# Patient Record
Sex: FEMALE | Race: BLACK OR AFRICAN AMERICAN | NOT HISPANIC OR LATINO | Employment: UNEMPLOYED | RURAL
[De-identification: names, ages, dates, MRNs, and addresses within clinical notes are randomized per-mention and may not be internally consistent; named-entity substitution may affect disease eponyms.]

---

## 2021-02-18 LAB
CREATININE URINE: 45
MICROALBUMIN UR-MCNC: 1.8 MG/DL
MICROALBUMIN/CREATININE RATIO: 40 UG/MG

## 2021-05-13 VITALS
HEART RATE: 91 BPM | WEIGHT: 91 LBS | HEIGHT: 62 IN | DIASTOLIC BLOOD PRESSURE: 83 MMHG | SYSTOLIC BLOOD PRESSURE: 128 MMHG | RESPIRATION RATE: 16 BRPM | BODY MASS INDEX: 16.75 KG/M2

## 2021-05-13 RX ORDER — SIMVASTATIN 5 MG/1
5 TABLET, FILM COATED ORAL NIGHTLY
COMMUNITY
End: 2021-08-17 | Stop reason: SDUPTHER

## 2021-05-13 RX ORDER — ASPIRIN 81 MG/1
81 TABLET ORAL DAILY
COMMUNITY

## 2021-05-13 RX ORDER — LISINOPRIL 10 MG/1
10 TABLET ORAL DAILY
COMMUNITY
End: 2021-08-17 | Stop reason: SDUPTHER

## 2021-05-13 RX ORDER — TRAMADOL HYDROCHLORIDE 50 MG/1
50 TABLET ORAL EVERY 6 HOURS PRN
COMMUNITY
End: 2021-06-28 | Stop reason: SDUPTHER

## 2021-05-13 RX ORDER — METFORMIN HYDROCHLORIDE 500 MG/1
500 TABLET ORAL 2 TIMES DAILY WITH MEALS
COMMUNITY
End: 2021-11-18 | Stop reason: ALTCHOICE

## 2021-05-13 RX ORDER — POTASSIUM CHLORIDE 750 MG/1
10 TABLET, EXTENDED RELEASE ORAL ONCE
COMMUNITY
End: 2021-08-17 | Stop reason: SDUPTHER

## 2021-05-13 RX ORDER — HYDROCHLOROTHIAZIDE 25 MG/1
25 TABLET ORAL DAILY
COMMUNITY
End: 2021-08-17 | Stop reason: SDUPTHER

## 2021-05-13 RX ORDER — IBUPROFEN 200 MG
CAPSULE ORAL DAILY
COMMUNITY
End: 2022-03-16

## 2021-05-18 ENCOUNTER — OFFICE VISIT (OUTPATIENT)
Dept: FAMILY MEDICINE | Facility: CLINIC | Age: 86
End: 2021-05-18
Payer: MEDICARE

## 2021-05-18 VITALS
DIASTOLIC BLOOD PRESSURE: 82 MMHG | OXYGEN SATURATION: 98 % | TEMPERATURE: 98 F | SYSTOLIC BLOOD PRESSURE: 139 MMHG | HEIGHT: 62 IN | HEART RATE: 93 BPM | BODY MASS INDEX: 17.26 KG/M2 | WEIGHT: 93.81 LBS

## 2021-05-18 DIAGNOSIS — I10 ESSENTIAL HYPERTENSION, MALIGNANT: Primary | ICD-10-CM

## 2021-05-18 DIAGNOSIS — E78.5 HYPERLIPIDEMIA, UNSPECIFIED HYPERLIPIDEMIA TYPE: ICD-10-CM

## 2021-05-18 DIAGNOSIS — E11.9 DIABETES MELLITUS WITHOUT COMPLICATION: ICD-10-CM

## 2021-05-18 LAB
ALBUMIN SERPL BCP-MCNC: 3.8 G/DL (ref 3.5–5)
ALBUMIN/GLOB SERPL: 1.2 {RATIO}
ALP SERPL-CCNC: 52 U/L (ref 55–142)
ALT SERPL W P-5'-P-CCNC: 14 U/L (ref 13–56)
ANION GAP SERPL CALCULATED.3IONS-SCNC: 10 MMOL/L (ref 7–16)
AST SERPL W P-5'-P-CCNC: 13 U/L (ref 15–37)
BILIRUB SERPL-MCNC: 0.4 MG/DL (ref 0–1.2)
BUN SERPL-MCNC: 20 MG/DL (ref 7–18)
BUN/CREAT SERPL: 30 (ref 6–20)
CALCIUM SERPL-MCNC: 9.4 MG/DL (ref 8.5–10.1)
CHLORIDE SERPL-SCNC: 103 MMOL/L (ref 98–107)
CHOLEST SERPL-MCNC: 167 MG/DL (ref 0–200)
CHOLEST/HDLC SERPL: 1.5 {RATIO}
CO2 SERPL-SCNC: 30 MMOL/L (ref 21–32)
CREAT SERPL-MCNC: 0.66 MG/DL (ref 0.55–1.02)
EST. AVERAGE GLUCOSE BLD GHB EST-MCNC: 94 MG/DL
GLOBULIN SER-MCNC: 3.3 G/DL (ref 2–4)
GLUCOSE SERPL-MCNC: 70 MG/DL (ref 74–106)
HBA1C MFR BLD HPLC: 5.4 % (ref 4.5–6.6)
HDLC SERPL-MCNC: 110 MG/DL (ref 40–60)
LDLC SERPL CALC-MCNC: 48 MG/DL
LDLC/HDLC SERPL: 0.4 {RATIO}
NONHDLC SERPL-MCNC: 57 MG/DL
POTASSIUM SERPL-SCNC: 4.2 MMOL/L (ref 3.5–5.1)
PROT SERPL-MCNC: 7.1 G/DL (ref 6.4–8.2)
SODIUM SERPL-SCNC: 139 MMOL/L (ref 136–145)
TRIGL SERPL-MCNC: 44 MG/DL (ref 35–150)
VLDLC SERPL-MCNC: 9 MG/DL

## 2021-05-18 PROCEDURE — 83036 HEMOGLOBIN A1C: ICD-10-PCS | Mod: ,,, | Performed by: CLINICAL MEDICAL LABORATORY

## 2021-05-18 PROCEDURE — 80053 COMPREHEN METABOLIC PANEL: CPT | Mod: ,,, | Performed by: CLINICAL MEDICAL LABORATORY

## 2021-05-18 PROCEDURE — 99214 PR OFFICE/OUTPT VISIT, EST, LEVL IV, 30-39 MIN: ICD-10-PCS | Mod: ,,, | Performed by: NURSE PRACTITIONER

## 2021-05-18 PROCEDURE — 83036 HEMOGLOBIN GLYCOSYLATED A1C: CPT | Mod: ,,, | Performed by: CLINICAL MEDICAL LABORATORY

## 2021-05-18 PROCEDURE — 1101F PT FALLS ASSESS-DOCD LE1/YR: CPT | Mod: ,,, | Performed by: NURSE PRACTITIONER

## 2021-05-18 PROCEDURE — 99214 OFFICE O/P EST MOD 30 MIN: CPT | Mod: ,,, | Performed by: NURSE PRACTITIONER

## 2021-05-18 PROCEDURE — 1126F AMNT PAIN NOTED NONE PRSNT: CPT | Mod: ,,, | Performed by: NURSE PRACTITIONER

## 2021-05-18 PROCEDURE — 1126F PR PAIN SEVERITY QUANTIFIED, NO PAIN PRESENT: ICD-10-PCS | Mod: ,,, | Performed by: NURSE PRACTITIONER

## 2021-05-18 PROCEDURE — 3288F PR FALLS RISK ASSESSMENT DOCUMENTED: ICD-10-PCS | Mod: ,,, | Performed by: NURSE PRACTITIONER

## 2021-05-18 PROCEDURE — 1101F PR PT FALLS ASSESS DOC 0-1 FALLS W/OUT INJ PAST YR: ICD-10-PCS | Mod: ,,, | Performed by: NURSE PRACTITIONER

## 2021-05-18 PROCEDURE — 1159F MED LIST DOCD IN RCRD: CPT | Mod: ,,, | Performed by: NURSE PRACTITIONER

## 2021-05-18 PROCEDURE — 1159F PR MEDICATION LIST DOCUMENTED IN MEDICAL RECORD: ICD-10-PCS | Mod: ,,, | Performed by: NURSE PRACTITIONER

## 2021-05-18 PROCEDURE — 80061 LIPID PANEL: ICD-10-PCS | Mod: ,,, | Performed by: CLINICAL MEDICAL LABORATORY

## 2021-05-18 PROCEDURE — 80061 LIPID PANEL: CPT | Mod: ,,, | Performed by: CLINICAL MEDICAL LABORATORY

## 2021-05-18 PROCEDURE — 80053 COMPREHENSIVE METABOLIC PANEL: ICD-10-PCS | Mod: ,,, | Performed by: CLINICAL MEDICAL LABORATORY

## 2021-05-18 PROCEDURE — 3288F FALL RISK ASSESSMENT DOCD: CPT | Mod: ,,, | Performed by: NURSE PRACTITIONER

## 2021-06-28 RX ORDER — TRAMADOL HYDROCHLORIDE 50 MG/1
50 TABLET ORAL EVERY 6 HOURS PRN
OUTPATIENT
Start: 2021-06-28

## 2021-06-29 RX ORDER — TRAMADOL HYDROCHLORIDE 50 MG/1
50 TABLET ORAL EVERY 6 HOURS PRN
Qty: 60 TABLET | Refills: 2 | Status: SHIPPED | OUTPATIENT
Start: 2021-06-29 | End: 2021-09-13 | Stop reason: SDUPTHER

## 2021-08-18 ENCOUNTER — OFFICE VISIT (OUTPATIENT)
Dept: FAMILY MEDICINE | Facility: CLINIC | Age: 86
End: 2021-08-18
Payer: MEDICARE

## 2021-08-18 VITALS
HEART RATE: 85 BPM | WEIGHT: 90.63 LBS | BODY MASS INDEX: 16.68 KG/M2 | HEIGHT: 62 IN | SYSTOLIC BLOOD PRESSURE: 143 MMHG | DIASTOLIC BLOOD PRESSURE: 84 MMHG

## 2021-08-18 DIAGNOSIS — Z79.4 TYPE 2 DIABETES MELLITUS WITHOUT COMPLICATION, WITH LONG-TERM CURRENT USE OF INSULIN: ICD-10-CM

## 2021-08-18 DIAGNOSIS — I10 ESSENTIAL HYPERTENSION, MALIGNANT: Primary | ICD-10-CM

## 2021-08-18 DIAGNOSIS — E78.5 HYPERLIPIDEMIA, UNSPECIFIED HYPERLIPIDEMIA TYPE: ICD-10-CM

## 2021-08-18 DIAGNOSIS — E11.9 TYPE 2 DIABETES MELLITUS WITHOUT COMPLICATION, WITH LONG-TERM CURRENT USE OF INSULIN: ICD-10-CM

## 2021-08-18 LAB
ALBUMIN SERPL BCP-MCNC: 3.8 G/DL (ref 3.5–5)
ALBUMIN/GLOB SERPL: 1.1 {RATIO}
ALP SERPL-CCNC: 52 U/L (ref 55–142)
ALT SERPL W P-5'-P-CCNC: 20 U/L (ref 13–56)
ANION GAP SERPL CALCULATED.3IONS-SCNC: 12 MMOL/L (ref 7–16)
AST SERPL W P-5'-P-CCNC: 21 U/L (ref 15–37)
BILIRUB SERPL-MCNC: 0.4 MG/DL (ref 0–1.2)
BUN SERPL-MCNC: 23 MG/DL (ref 7–18)
BUN/CREAT SERPL: 32 (ref 6–20)
CALCIUM SERPL-MCNC: 9.4 MG/DL (ref 8.5–10.1)
CHLORIDE SERPL-SCNC: 103 MMOL/L (ref 98–107)
CHOLEST SERPL-MCNC: 162 MG/DL (ref 0–200)
CHOLEST/HDLC SERPL: 1.4 {RATIO}
CO2 SERPL-SCNC: 27 MMOL/L (ref 21–32)
CREAT SERPL-MCNC: 0.71 MG/DL (ref 0.55–1.02)
GLOBULIN SER-MCNC: 3.6 G/DL (ref 2–4)
GLUCOSE SERPL-MCNC: 70 MG/DL (ref 74–106)
HDLC SERPL-MCNC: 119 MG/DL (ref 40–60)
LDLC SERPL CALC-MCNC: 33 MG/DL
LDLC/HDLC SERPL: 0.3 {RATIO}
NONHDLC SERPL-MCNC: 43 MG/DL
POTASSIUM SERPL-SCNC: 4 MMOL/L (ref 3.5–5.1)
PROT SERPL-MCNC: 7.4 G/DL (ref 6.4–8.2)
SODIUM SERPL-SCNC: 138 MMOL/L (ref 136–145)
TRIGL SERPL-MCNC: 50 MG/DL (ref 35–150)
VLDLC SERPL-MCNC: 10 MG/DL

## 2021-08-18 PROCEDURE — 1160F RVW MEDS BY RX/DR IN RCRD: CPT | Mod: ,,, | Performed by: NURSE PRACTITIONER

## 2021-08-18 PROCEDURE — 1159F PR MEDICATION LIST DOCUMENTED IN MEDICAL RECORD: ICD-10-PCS | Mod: ,,, | Performed by: NURSE PRACTITIONER

## 2021-08-18 PROCEDURE — 99212 OFFICE O/P EST SF 10 MIN: CPT | Mod: ,,, | Performed by: NURSE PRACTITIONER

## 2021-08-18 PROCEDURE — 1160F PR REVIEW ALL MEDS BY PRESCRIBER/CLIN PHARMACIST DOCUMENTED: ICD-10-PCS | Mod: ,,, | Performed by: NURSE PRACTITIONER

## 2021-08-18 PROCEDURE — 80053 COMPREHENSIVE METABOLIC PANEL: ICD-10-PCS | Mod: ,,, | Performed by: CLINICAL MEDICAL LABORATORY

## 2021-08-18 PROCEDURE — 80061 LIPID PANEL: CPT | Mod: ,,, | Performed by: CLINICAL MEDICAL LABORATORY

## 2021-08-18 PROCEDURE — 80061 LIPID PANEL: ICD-10-PCS | Mod: ,,, | Performed by: CLINICAL MEDICAL LABORATORY

## 2021-08-18 PROCEDURE — 80053 COMPREHEN METABOLIC PANEL: CPT | Mod: ,,, | Performed by: CLINICAL MEDICAL LABORATORY

## 2021-08-18 PROCEDURE — 1159F MED LIST DOCD IN RCRD: CPT | Mod: ,,, | Performed by: NURSE PRACTITIONER

## 2021-08-18 PROCEDURE — 99212 PR OFFICE/OUTPT VISIT, EST, LEVL II, 10-19 MIN: ICD-10-PCS | Mod: ,,, | Performed by: NURSE PRACTITIONER

## 2021-08-18 RX ORDER — LISINOPRIL 10 MG/1
10 TABLET ORAL DAILY
Qty: 90 TABLET | Refills: 0 | Status: SHIPPED | OUTPATIENT
Start: 2021-08-18 | End: 2021-11-17 | Stop reason: SDUPTHER

## 2021-08-18 RX ORDER — POTASSIUM CHLORIDE 750 MG/1
10 TABLET, EXTENDED RELEASE ORAL DAILY
Qty: 90 TABLET | Refills: 0 | Status: SHIPPED | OUTPATIENT
Start: 2021-08-18 | End: 2021-11-17 | Stop reason: SDUPTHER

## 2021-08-18 RX ORDER — SIMVASTATIN 5 MG/1
5 TABLET, FILM COATED ORAL NIGHTLY
Qty: 90 TABLET | Refills: 0 | Status: SHIPPED | OUTPATIENT
Start: 2021-08-18 | End: 2021-11-17 | Stop reason: SDUPTHER

## 2021-08-18 RX ORDER — METFORMIN HYDROCHLORIDE 500 MG/1
500 TABLET ORAL 2 TIMES DAILY WITH MEALS
Qty: 180 TABLET | Refills: 0 | Status: CANCELLED | OUTPATIENT
Start: 2021-08-18

## 2021-08-18 RX ORDER — HYDROCHLOROTHIAZIDE 25 MG/1
25 TABLET ORAL DAILY
Qty: 90 TABLET | Refills: 0 | Status: SHIPPED | OUTPATIENT
Start: 2021-08-18 | End: 2021-11-17 | Stop reason: SDUPTHER

## 2021-08-31 RX ORDER — TRAMADOL HYDROCHLORIDE 50 MG/1
50 TABLET ORAL EVERY 6 HOURS PRN
Qty: 60 TABLET | Refills: 2 | Status: CANCELLED | OUTPATIENT
Start: 2021-08-31

## 2021-09-09 ENCOUNTER — OFFICE VISIT (OUTPATIENT)
Dept: FAMILY MEDICINE | Facility: CLINIC | Age: 86
End: 2021-09-09
Payer: MEDICARE

## 2021-09-09 ENCOUNTER — PATIENT OUTREACH (OUTPATIENT)
Dept: FAMILY MEDICINE | Facility: CLINIC | Age: 86
End: 2021-09-09

## 2021-09-09 VITALS
DIASTOLIC BLOOD PRESSURE: 80 MMHG | WEIGHT: 90 LBS | HEIGHT: 62 IN | BODY MASS INDEX: 16.56 KG/M2 | TEMPERATURE: 98 F | SYSTOLIC BLOOD PRESSURE: 118 MMHG | OXYGEN SATURATION: 99 % | HEART RATE: 83 BPM | RESPIRATION RATE: 20 BRPM

## 2021-09-09 DIAGNOSIS — E11.9 TYPE 2 DIABETES MELLITUS WITHOUT COMPLICATION, WITH LONG-TERM CURRENT USE OF INSULIN: Primary | ICD-10-CM

## 2021-09-09 DIAGNOSIS — Z13.89 ENCOUNTER FOR SCREENING FOR OTHER DISORDER: ICD-10-CM

## 2021-09-09 DIAGNOSIS — I10 ESSENTIAL HYPERTENSION, MALIGNANT: ICD-10-CM

## 2021-09-09 DIAGNOSIS — E78.5 HYPERLIPIDEMIA, UNSPECIFIED HYPERLIPIDEMIA TYPE: ICD-10-CM

## 2021-09-09 DIAGNOSIS — R63.6 UNDERWEIGHT: ICD-10-CM

## 2021-09-09 DIAGNOSIS — E63.9 POOR NUTRITION: ICD-10-CM

## 2021-09-09 DIAGNOSIS — Z74.09 OTHER REDUCED MOBILITY: ICD-10-CM

## 2021-09-09 DIAGNOSIS — Z00.00 ENCOUNTER FOR PREVENTIVE HEALTH EXAMINATION: ICD-10-CM

## 2021-09-09 DIAGNOSIS — Z79.4 TYPE 2 DIABETES MELLITUS WITHOUT COMPLICATION, WITH LONG-TERM CURRENT USE OF INSULIN: Primary | ICD-10-CM

## 2021-09-09 PROCEDURE — 1160F PR REVIEW ALL MEDS BY PRESCRIBER/CLIN PHARMACIST DOCUMENTED: ICD-10-PCS | Mod: ,,, | Performed by: NURSE PRACTITIONER

## 2021-09-09 PROCEDURE — 1101F PT FALLS ASSESS-DOCD LE1/YR: CPT | Mod: ,,, | Performed by: NURSE PRACTITIONER

## 2021-09-09 PROCEDURE — 1125F AMNT PAIN NOTED PAIN PRSNT: CPT | Mod: ,,, | Performed by: NURSE PRACTITIONER

## 2021-09-09 PROCEDURE — 3048F PR MOST RECENT LDL-C < 100 MG/DL: ICD-10-PCS | Mod: ,,, | Performed by: NURSE PRACTITIONER

## 2021-09-09 PROCEDURE — G0439 PR MEDICARE ANNUAL WELLNESS SUBSEQUENT VISIT: ICD-10-PCS | Mod: CG,,, | Performed by: NURSE PRACTITIONER

## 2021-09-09 PROCEDURE — 3061F PR NEG MICROALBUMINURIA RESULT DOCUMENTED/REVIEW: ICD-10-PCS | Mod: ,,, | Performed by: NURSE PRACTITIONER

## 2021-09-09 PROCEDURE — 1036F PR CURRENT TOBACCO NON-USER: ICD-10-PCS | Mod: ,,, | Performed by: NURSE PRACTITIONER

## 2021-09-09 PROCEDURE — 3079F PR MOST RECENT DIASTOLIC BLOOD PRESSURE 80-89 MM HG: ICD-10-PCS | Mod: ,,, | Performed by: NURSE PRACTITIONER

## 2021-09-09 PROCEDURE — 1036F TOBACCO NON-USER: CPT | Mod: ,,, | Performed by: NURSE PRACTITIONER

## 2021-09-09 PROCEDURE — 1170F FXNL STATUS ASSESSED: CPT | Mod: ,,, | Performed by: NURSE PRACTITIONER

## 2021-09-09 PROCEDURE — 3288F PR FALLS RISK ASSESSMENT DOCUMENTED: ICD-10-PCS | Mod: ,,, | Performed by: NURSE PRACTITIONER

## 2021-09-09 PROCEDURE — 3048F LDL-C <100 MG/DL: CPT | Mod: ,,, | Performed by: NURSE PRACTITIONER

## 2021-09-09 PROCEDURE — 3074F PR MOST RECENT SYSTOLIC BLOOD PRESSURE < 130 MM HG: ICD-10-PCS | Mod: ,,, | Performed by: NURSE PRACTITIONER

## 2021-09-09 PROCEDURE — 1159F PR MEDICATION LIST DOCUMENTED IN MEDICAL RECORD: ICD-10-PCS | Mod: ,,, | Performed by: NURSE PRACTITIONER

## 2021-09-09 PROCEDURE — 1170F PR FUNCTIONAL STATUS ASSESSED: ICD-10-PCS | Mod: ,,, | Performed by: NURSE PRACTITIONER

## 2021-09-09 PROCEDURE — 3288F FALL RISK ASSESSMENT DOCD: CPT | Mod: ,,, | Performed by: NURSE PRACTITIONER

## 2021-09-09 PROCEDURE — G0439 PPPS, SUBSEQ VISIT: HCPCS | Mod: CG,,, | Performed by: NURSE PRACTITIONER

## 2021-09-09 PROCEDURE — 3079F DIAST BP 80-89 MM HG: CPT | Mod: ,,, | Performed by: NURSE PRACTITIONER

## 2021-09-09 PROCEDURE — 1158F PR ADVANCE CARE PLANNING DISCUSS DOCUMENTED IN MEDICAL RECORD: ICD-10-PCS | Mod: ,,, | Performed by: NURSE PRACTITIONER

## 2021-09-09 PROCEDURE — 1101F PR PT FALLS ASSESS DOC 0-1 FALLS W/OUT INJ PAST YR: ICD-10-PCS | Mod: ,,, | Performed by: NURSE PRACTITIONER

## 2021-09-09 PROCEDURE — 3061F NEG MICROALBUMINURIA REV: CPT | Mod: ,,, | Performed by: NURSE PRACTITIONER

## 2021-09-09 PROCEDURE — 1160F RVW MEDS BY RX/DR IN RCRD: CPT | Mod: ,,, | Performed by: NURSE PRACTITIONER

## 2021-09-09 PROCEDURE — 3074F SYST BP LT 130 MM HG: CPT | Mod: ,,, | Performed by: NURSE PRACTITIONER

## 2021-09-09 PROCEDURE — 1125F PR PAIN SEVERITY QUANTIFIED, PAIN PRESENT: ICD-10-PCS | Mod: ,,, | Performed by: NURSE PRACTITIONER

## 2021-09-09 PROCEDURE — 1159F MED LIST DOCD IN RCRD: CPT | Mod: ,,, | Performed by: NURSE PRACTITIONER

## 2021-09-09 PROCEDURE — 1158F ADVNC CARE PLAN TLK DOCD: CPT | Mod: ,,, | Performed by: NURSE PRACTITIONER

## 2021-09-13 RX ORDER — TRAMADOL HYDROCHLORIDE 50 MG/1
50 TABLET ORAL EVERY 6 HOURS PRN
Qty: 60 TABLET | Refills: 2 | Status: SHIPPED | OUTPATIENT
Start: 2021-09-13 | End: 2021-11-18 | Stop reason: SDUPTHER

## 2021-09-21 ENCOUNTER — CLINICAL SUPPORT (OUTPATIENT)
Dept: FAMILY MEDICINE | Facility: CLINIC | Age: 86
End: 2021-09-21
Payer: MEDICARE

## 2021-09-21 PROCEDURE — 90686 FLU VACCINE (QUAD) GREATER THAN OR EQUAL TO 3YO PRESERVATIVE FREE IM: ICD-10-PCS | Mod: ,,, | Performed by: NURSE PRACTITIONER

## 2021-09-21 PROCEDURE — G0008 ADMIN INFLUENZA VIRUS VAC: HCPCS | Mod: ,,, | Performed by: NURSE PRACTITIONER

## 2021-09-21 PROCEDURE — 90686 IIV4 VACC NO PRSV 0.5 ML IM: CPT | Mod: ,,, | Performed by: NURSE PRACTITIONER

## 2021-09-21 PROCEDURE — G0008 FLU VACCINE (QUAD) GREATER THAN OR EQUAL TO 3YO PRESERVATIVE FREE IM: ICD-10-PCS | Mod: ,,, | Performed by: NURSE PRACTITIONER

## 2021-11-18 ENCOUNTER — OFFICE VISIT (OUTPATIENT)
Dept: FAMILY MEDICINE | Facility: CLINIC | Age: 86
End: 2021-11-18
Payer: MEDICARE

## 2021-11-18 VITALS
RESPIRATION RATE: 16 BRPM | WEIGHT: 94 LBS | DIASTOLIC BLOOD PRESSURE: 77 MMHG | HEART RATE: 89 BPM | BODY MASS INDEX: 17.3 KG/M2 | SYSTOLIC BLOOD PRESSURE: 134 MMHG | HEIGHT: 62 IN

## 2021-11-18 DIAGNOSIS — I10 ESSENTIAL HYPERTENSION, MALIGNANT: Primary | ICD-10-CM

## 2021-11-18 DIAGNOSIS — E78.5 HYPERLIPIDEMIA, UNSPECIFIED HYPERLIPIDEMIA TYPE: ICD-10-CM

## 2021-11-18 DIAGNOSIS — I10 ESSENTIAL HYPERTENSION, MALIGNANT: ICD-10-CM

## 2021-11-18 DIAGNOSIS — E11.9 DIABETES MELLITUS WITHOUT COMPLICATION: ICD-10-CM

## 2021-11-18 DIAGNOSIS — J01.10 ACUTE NON-RECURRENT FRONTAL SINUSITIS: ICD-10-CM

## 2021-11-18 LAB
ALBUMIN SERPL BCP-MCNC: 3.7 G/DL (ref 3.5–5)
ALBUMIN/GLOB SERPL: 0.9 {RATIO}
ALP SERPL-CCNC: 56 U/L (ref 55–142)
ALT SERPL W P-5'-P-CCNC: 19 U/L (ref 13–56)
ANION GAP SERPL CALCULATED.3IONS-SCNC: 10 MMOL/L (ref 7–16)
AST SERPL W P-5'-P-CCNC: 17 U/L (ref 15–37)
BASOPHILS # BLD AUTO: 0.05 K/UL (ref 0–0.2)
BASOPHILS NFR BLD AUTO: 0.5 % (ref 0–1)
BILIRUB SERPL-MCNC: 0.5 MG/DL (ref 0–1.2)
BUN SERPL-MCNC: 19 MG/DL (ref 7–18)
BUN/CREAT SERPL: 23 (ref 6–20)
CALCIUM SERPL-MCNC: 9.6 MG/DL (ref 8.5–10.1)
CHLORIDE SERPL-SCNC: 104 MMOL/L (ref 98–107)
CHOLEST SERPL-MCNC: 177 MG/DL (ref 0–200)
CHOLEST/HDLC SERPL: 1.5 {RATIO}
CO2 SERPL-SCNC: 29 MMOL/L (ref 21–32)
CREAT SERPL-MCNC: 0.81 MG/DL (ref 0.55–1.02)
DIFFERENTIAL METHOD BLD: ABNORMAL
EOSINOPHIL # BLD AUTO: 0.09 K/UL (ref 0–0.5)
EOSINOPHIL NFR BLD AUTO: 0.9 % (ref 1–4)
ERYTHROCYTE [DISTWIDTH] IN BLOOD BY AUTOMATED COUNT: 12.8 % (ref 11.5–14.5)
EST. AVERAGE GLUCOSE BLD GHB EST-MCNC: 107 MG/DL
GLOBULIN SER-MCNC: 3.9 G/DL (ref 2–4)
GLUCOSE SERPL-MCNC: 101 MG/DL (ref 74–106)
HBA1C MFR BLD HPLC: 5.8 % (ref 4.5–6.6)
HCT VFR BLD AUTO: 38.8 % (ref 38–47)
HDLC SERPL-MCNC: 115 MG/DL (ref 40–60)
HGB BLD-MCNC: 11.8 G/DL (ref 12–16)
IMM GRANULOCYTES # BLD AUTO: 0.05 K/UL (ref 0–0.04)
IMM GRANULOCYTES NFR BLD: 0.5 % (ref 0–0.4)
LDLC SERPL CALC-MCNC: 50 MG/DL
LDLC/HDLC SERPL: 0.4 {RATIO}
LYMPHOCYTES # BLD AUTO: 2.25 K/UL (ref 1–4.8)
LYMPHOCYTES NFR BLD AUTO: 21.3 % (ref 27–41)
MCH RBC QN AUTO: 29.1 PG (ref 27–31)
MCHC RBC AUTO-ENTMCNC: 30.4 G/DL (ref 32–36)
MCV RBC AUTO: 95.8 FL (ref 80–96)
MONOCYTES # BLD AUTO: 0.6 K/UL (ref 0–0.8)
MONOCYTES NFR BLD AUTO: 5.7 % (ref 2–6)
MPC BLD CALC-MCNC: 10 FL (ref 9.4–12.4)
NEUTROPHILS # BLD AUTO: 7.54 K/UL (ref 1.8–7.7)
NEUTROPHILS NFR BLD AUTO: 71.1 % (ref 53–65)
NONHDLC SERPL-MCNC: 62 MG/DL
NRBC # BLD AUTO: 0 X10E3/UL
NRBC, AUTO (.00): 0 %
PLATELET # BLD AUTO: 301 K/UL (ref 150–400)
POTASSIUM SERPL-SCNC: 4.1 MMOL/L (ref 3.5–5.1)
PROT SERPL-MCNC: 7.6 G/DL (ref 6.4–8.2)
RBC # BLD AUTO: 4.05 M/UL (ref 4.2–5.4)
SODIUM SERPL-SCNC: 139 MMOL/L (ref 136–145)
TRIGL SERPL-MCNC: 60 MG/DL (ref 35–150)
VLDLC SERPL-MCNC: 12 MG/DL
WBC # BLD AUTO: 10.58 K/UL (ref 4.5–11)

## 2021-11-18 PROCEDURE — 99213 OFFICE O/P EST LOW 20 MIN: CPT | Mod: 25,,, | Performed by: NURSE PRACTITIONER

## 2021-11-18 PROCEDURE — 1160F RVW MEDS BY RX/DR IN RCRD: CPT | Mod: ,,, | Performed by: NURSE PRACTITIONER

## 2021-11-18 PROCEDURE — 80053 COMPREHEN METABOLIC PANEL: CPT | Mod: ,,, | Performed by: CLINICAL MEDICAL LABORATORY

## 2021-11-18 PROCEDURE — 80061 LIPID PANEL: ICD-10-PCS | Mod: ,,, | Performed by: CLINICAL MEDICAL LABORATORY

## 2021-11-18 PROCEDURE — 85025 COMPLETE CBC W/AUTO DIFF WBC: CPT | Mod: ,,, | Performed by: CLINICAL MEDICAL LABORATORY

## 2021-11-18 PROCEDURE — 83036 HEMOGLOBIN GLYCOSYLATED A1C: CPT | Mod: ,,, | Performed by: CLINICAL MEDICAL LABORATORY

## 2021-11-18 PROCEDURE — 1160F PR REVIEW ALL MEDS BY PRESCRIBER/CLIN PHARMACIST DOCUMENTED: ICD-10-PCS | Mod: ,,, | Performed by: NURSE PRACTITIONER

## 2021-11-18 PROCEDURE — 96372 PR INJECTION,THERAP/PROPH/DIAG2ST, IM OR SUBCUT: ICD-10-PCS | Mod: ,,, | Performed by: NURSE PRACTITIONER

## 2021-11-18 PROCEDURE — 83036 HEMOGLOBIN A1C: ICD-10-PCS | Mod: ,,, | Performed by: CLINICAL MEDICAL LABORATORY

## 2021-11-18 PROCEDURE — 99213 PR OFFICE/OUTPT VISIT, EST, LEVL III, 20-29 MIN: ICD-10-PCS | Mod: 25,,, | Performed by: NURSE PRACTITIONER

## 2021-11-18 PROCEDURE — 1159F PR MEDICATION LIST DOCUMENTED IN MEDICAL RECORD: ICD-10-PCS | Mod: ,,, | Performed by: NURSE PRACTITIONER

## 2021-11-18 PROCEDURE — 1159F MED LIST DOCD IN RCRD: CPT | Mod: ,,, | Performed by: NURSE PRACTITIONER

## 2021-11-18 PROCEDURE — 80061 LIPID PANEL: CPT | Mod: ,,, | Performed by: CLINICAL MEDICAL LABORATORY

## 2021-11-18 PROCEDURE — 80053 COMPREHENSIVE METABOLIC PANEL: ICD-10-PCS | Mod: ,,, | Performed by: CLINICAL MEDICAL LABORATORY

## 2021-11-18 PROCEDURE — 85025 CBC WITH DIFFERENTIAL: ICD-10-PCS | Mod: ,,, | Performed by: CLINICAL MEDICAL LABORATORY

## 2021-11-18 PROCEDURE — 96372 THER/PROPH/DIAG INJ SC/IM: CPT | Mod: ,,, | Performed by: NURSE PRACTITIONER

## 2021-11-18 RX ORDER — BETAMETHASONE SODIUM PHOSPHATE AND BETAMETHASONE ACETATE 3; 3 MG/ML; MG/ML
6 INJECTION, SUSPENSION INTRA-ARTICULAR; INTRALESIONAL; INTRAMUSCULAR; SOFT TISSUE
Status: COMPLETED | OUTPATIENT
Start: 2021-11-18 | End: 2021-11-18

## 2021-11-18 RX ORDER — TRAMADOL HYDROCHLORIDE 50 MG/1
50 TABLET ORAL EVERY 6 HOURS PRN
Qty: 60 TABLET | Refills: 2 | Status: SHIPPED | OUTPATIENT
Start: 2021-11-18 | End: 2022-05-05 | Stop reason: SDUPTHER

## 2021-11-18 RX ORDER — AZITHROMYCIN 250 MG/1
TABLET, FILM COATED ORAL
Qty: 6 TABLET | Refills: 0 | Status: SHIPPED | OUTPATIENT
Start: 2021-11-18 | End: 2021-11-23

## 2021-11-18 RX ORDER — HYDROCHLOROTHIAZIDE 25 MG/1
25 TABLET ORAL DAILY
Qty: 90 TABLET | Refills: 1 | Status: SHIPPED | OUTPATIENT
Start: 2021-11-18 | End: 2022-02-03 | Stop reason: SDUPTHER

## 2021-11-18 RX ORDER — METFORMIN HYDROCHLORIDE 500 MG/1
500 TABLET ORAL 2 TIMES DAILY WITH MEALS
Qty: 180 TABLET | Refills: 1 | Status: CANCELLED | OUTPATIENT
Start: 2021-11-18

## 2021-11-18 RX ORDER — CEFTRIAXONE 500 MG/1
500 INJECTION, POWDER, FOR SOLUTION INTRAMUSCULAR; INTRAVENOUS
Status: COMPLETED | OUTPATIENT
Start: 2021-11-18 | End: 2021-11-18

## 2021-11-18 RX ORDER — POTASSIUM CHLORIDE 750 MG/1
10 TABLET, EXTENDED RELEASE ORAL DAILY
Qty: 90 TABLET | Refills: 1 | Status: SHIPPED | OUTPATIENT
Start: 2021-11-18 | End: 2022-02-03 | Stop reason: SDUPTHER

## 2021-11-18 RX ORDER — LISINOPRIL 10 MG/1
10 TABLET ORAL DAILY
Qty: 90 TABLET | Refills: 1 | Status: SHIPPED | OUTPATIENT
Start: 2021-11-18 | End: 2022-02-03 | Stop reason: SDUPTHER

## 2021-11-18 RX ORDER — SIMVASTATIN 5 MG/1
5 TABLET, FILM COATED ORAL NIGHTLY
Qty: 90 TABLET | Refills: 1 | Status: SHIPPED | OUTPATIENT
Start: 2021-11-18 | End: 2022-02-03 | Stop reason: SDUPTHER

## 2021-11-18 RX ADMIN — CEFTRIAXONE 500 MG: 500 INJECTION, POWDER, FOR SOLUTION INTRAMUSCULAR; INTRAVENOUS at 09:11

## 2021-11-18 RX ADMIN — BETAMETHASONE SODIUM PHOSPHATE AND BETAMETHASONE ACETATE 6 MG: 3; 3 INJECTION, SUSPENSION INTRA-ARTICULAR; INTRALESIONAL; INTRAMUSCULAR; SOFT TISSUE at 09:11

## 2022-02-03 ENCOUNTER — OFFICE VISIT (OUTPATIENT)
Dept: FAMILY MEDICINE | Facility: CLINIC | Age: 87
End: 2022-02-03
Payer: MEDICARE

## 2022-02-03 VITALS
SYSTOLIC BLOOD PRESSURE: 138 MMHG | BODY MASS INDEX: 16.75 KG/M2 | WEIGHT: 91 LBS | DIASTOLIC BLOOD PRESSURE: 78 MMHG | RESPIRATION RATE: 18 BRPM | HEIGHT: 62 IN | HEART RATE: 118 BPM

## 2022-02-03 DIAGNOSIS — E78.5 HYPERLIPIDEMIA, UNSPECIFIED HYPERLIPIDEMIA TYPE: ICD-10-CM

## 2022-02-03 DIAGNOSIS — I10 ESSENTIAL HYPERTENSION, MALIGNANT: ICD-10-CM

## 2022-02-03 DIAGNOSIS — E11.9 DIABETES MELLITUS WITHOUT COMPLICATION: ICD-10-CM

## 2022-02-03 DIAGNOSIS — I10 ESSENTIAL HYPERTENSION, MALIGNANT: Primary | ICD-10-CM

## 2022-02-03 LAB
ALBUMIN SERPL BCP-MCNC: 2.5 G/DL (ref 3.5–5)
ALBUMIN/GLOB SERPL: 0.5 {RATIO}
ALP SERPL-CCNC: 107 U/L (ref 55–142)
ALT SERPL W P-5'-P-CCNC: 20 U/L (ref 13–56)
ANION GAP SERPL CALCULATED.3IONS-SCNC: 9 MMOL/L (ref 7–16)
AST SERPL W P-5'-P-CCNC: 20 U/L (ref 15–37)
BILIRUB SERPL-MCNC: 0.6 MG/DL (ref 0–1.2)
BUN SERPL-MCNC: 14 MG/DL (ref 7–18)
BUN/CREAT SERPL: 20 (ref 6–20)
CALCIUM SERPL-MCNC: 9.7 MG/DL (ref 8.5–10.1)
CHLORIDE SERPL-SCNC: 101 MMOL/L (ref 98–107)
CHOLEST SERPL-MCNC: 121 MG/DL (ref 0–200)
CHOLEST/HDLC SERPL: 1.6 {RATIO}
CO2 SERPL-SCNC: 32 MMOL/L (ref 21–32)
CREAT SERPL-MCNC: 0.71 MG/DL (ref 0.55–1.02)
EST. AVERAGE GLUCOSE BLD GHB EST-MCNC: 107 MG/DL
GLOBULIN SER-MCNC: 5.2 G/DL (ref 2–4)
GLUCOSE SERPL-MCNC: 119 MG/DL (ref 74–106)
HBA1C MFR BLD HPLC: 5.8 % (ref 4.5–6.6)
HDLC SERPL-MCNC: 76 MG/DL (ref 40–60)
LDLC SERPL CALC-MCNC: 32 MG/DL
LDLC/HDLC SERPL: 0.4 {RATIO}
NONHDLC SERPL-MCNC: 45 MG/DL
POTASSIUM SERPL-SCNC: 4.3 MMOL/L (ref 3.5–5.1)
PROT SERPL-MCNC: 7.7 G/DL (ref 6.4–8.2)
SODIUM SERPL-SCNC: 138 MMOL/L (ref 136–145)
TRIGL SERPL-MCNC: 64 MG/DL (ref 35–150)
VLDLC SERPL-MCNC: 13 MG/DL

## 2022-02-03 PROCEDURE — 1160F PR REVIEW ALL MEDS BY PRESCRIBER/CLIN PHARMACIST DOCUMENTED: ICD-10-PCS | Mod: ,,, | Performed by: NURSE PRACTITIONER

## 2022-02-03 PROCEDURE — 80061 LIPID PANEL: ICD-10-PCS | Mod: ,,, | Performed by: CLINICAL MEDICAL LABORATORY

## 2022-02-03 PROCEDURE — 99212 PR OFFICE/OUTPT VISIT, EST, LEVL II, 10-19 MIN: ICD-10-PCS | Mod: ,,, | Performed by: NURSE PRACTITIONER

## 2022-02-03 PROCEDURE — 99212 OFFICE O/P EST SF 10 MIN: CPT | Mod: ,,, | Performed by: NURSE PRACTITIONER

## 2022-02-03 PROCEDURE — 1159F PR MEDICATION LIST DOCUMENTED IN MEDICAL RECORD: ICD-10-PCS | Mod: ,,, | Performed by: NURSE PRACTITIONER

## 2022-02-03 PROCEDURE — 1160F RVW MEDS BY RX/DR IN RCRD: CPT | Mod: ,,, | Performed by: NURSE PRACTITIONER

## 2022-02-03 PROCEDURE — 1159F MED LIST DOCD IN RCRD: CPT | Mod: ,,, | Performed by: NURSE PRACTITIONER

## 2022-02-03 PROCEDURE — 83036 HEMOGLOBIN GLYCOSYLATED A1C: CPT | Mod: ,,, | Performed by: CLINICAL MEDICAL LABORATORY

## 2022-02-03 PROCEDURE — 83036 HEMOGLOBIN A1C: ICD-10-PCS | Mod: ,,, | Performed by: CLINICAL MEDICAL LABORATORY

## 2022-02-03 PROCEDURE — 80053 COMPREHENSIVE METABOLIC PANEL: ICD-10-PCS | Mod: ,,, | Performed by: CLINICAL MEDICAL LABORATORY

## 2022-02-03 PROCEDURE — 80061 LIPID PANEL: CPT | Mod: ,,, | Performed by: CLINICAL MEDICAL LABORATORY

## 2022-02-03 PROCEDURE — 80053 COMPREHEN METABOLIC PANEL: CPT | Mod: ,,, | Performed by: CLINICAL MEDICAL LABORATORY

## 2022-02-03 RX ORDER — MEGESTROL ACETATE 40 MG/1
40 TABLET ORAL DAILY
Qty: 90 TABLET | Refills: 1 | Status: SHIPPED | OUTPATIENT
Start: 2022-02-03 | End: 2022-05-05 | Stop reason: SDUPTHER

## 2022-02-03 RX ORDER — HYDROCHLOROTHIAZIDE 25 MG/1
25 TABLET ORAL DAILY
Qty: 90 TABLET | Refills: 1 | Status: SHIPPED | OUTPATIENT
Start: 2022-02-03 | End: 2022-05-05 | Stop reason: SDUPTHER

## 2022-02-03 RX ORDER — POTASSIUM CHLORIDE 750 MG/1
10 TABLET, EXTENDED RELEASE ORAL DAILY
Qty: 90 TABLET | Refills: 1 | Status: SHIPPED | OUTPATIENT
Start: 2022-02-03 | End: 2022-05-05 | Stop reason: SDUPTHER

## 2022-02-03 RX ORDER — SIMVASTATIN 5 MG/1
5 TABLET, FILM COATED ORAL NIGHTLY
Qty: 90 TABLET | Refills: 1 | Status: SHIPPED | OUTPATIENT
Start: 2022-02-03 | End: 2022-05-05 | Stop reason: SDUPTHER

## 2022-02-03 RX ORDER — LISINOPRIL 10 MG/1
10 TABLET ORAL DAILY
Qty: 90 TABLET | Refills: 1 | Status: SHIPPED | OUTPATIENT
Start: 2022-02-03 | End: 2022-05-05 | Stop reason: SDUPTHER

## 2022-02-03 NOTE — PROGRESS NOTES
"New clinic note    Gisel Shelby is a 89 y.o. female      Chief Complaint   Patient presents with    Follow-up    Hyperlipidemia    Diabetes        Subjective:  Pt has lost 3pounds since last appt. Will start on megace. Encourage use of supplemental shakes between meals and to eat several small meals throughout the day. She cont to hold dm meds and monitors glucose at home. Overall doing well.        Past Medical History:   Diagnosis Date    Hypertension       Family History   Problem Relation Age of Onset    Hypertension Mother     Diabetes Sister     Hypertension Sister     Diabetes Brother     Hypertension Brother       Past Surgical History:   Procedure Laterality Date    APPENDECTOMY        Social History     Socioeconomic History    Marital status: Single   Tobacco Use    Smoking status: Never Smoker    Smokeless tobacco: Never Used   Substance and Sexual Activity    Alcohol use: Never    Drug use: Never    Sexual activity: Not Currently        Review of Systems   Constitutional: Positive for appetite change. Negative for fatigue and fever.   HENT: Negative for nasal congestion and sore throat.    Eyes: Negative for visual disturbance.   Respiratory: Negative for chest tightness and shortness of breath.    Cardiovascular: Negative for chest pain and leg swelling.   Gastrointestinal: Negative for abdominal pain, change in bowel habit and change in bowel habit.   Endocrine: Negative for polydipsia, polyphagia and polyuria.   Genitourinary: Negative for dysuria and hematuria.   Musculoskeletal: Negative for back pain and leg pain.   Integumentary:  Negative for rash.   Neurological: Negative for dizziness, syncope, weakness and light-headedness.        Objective:  /78 (BP Location: Left arm, Patient Position: Sitting, BP Method: Small (Automatic))   Pulse (!) 118   Resp 18   Ht 5' 2" (1.575 m)   Wt 41.3 kg (91 lb)   BMI 16.64 kg/m²    Physical Exam  Constitutional:       General: She " is not in acute distress.     Appearance: Normal appearance.   HENT:      Head: Normocephalic.      Nose: No congestion or rhinorrhea.   Eyes:      Pupils: Pupils are equal, round, and reactive to light.   Cardiovascular:      Rate and Rhythm: Normal rate and regular rhythm.      Heart sounds: Normal heart sounds. No murmur heard.      Pulmonary:      Effort: Pulmonary effort is normal.      Breath sounds: Normal breath sounds. No wheezing or rhonchi.   Abdominal:      General: Bowel sounds are normal. There is no distension.      Hernia: No hernia is present.   Musculoskeletal:         General: No swelling or tenderness.      Right lower leg: No edema.      Left lower leg: No edema.   Neurological:      General: No focal deficit present.      Mental Status: She is alert and oriented to person, place, and time.          Assessment/plan:  1. Essential hypertension, malignant    2. Hyperlipidemia, unspecified hyperlipidemia type    3. Diabetes mellitus without complication    4. Essential hypertension, malignant    5. Hyperlipidemia, unspecified hyperlipidemia type         Problem List Items Addressed This Visit    None     Visit Diagnoses     Essential hypertension, malignant    -  Primary    bp looks good today  cont home meds      Relevant Medications    hydroCHLOROthiazide (HYDRODIURIL) 25 MG tablet    lisinopriL 10 MG tablet    potassium chloride SA (K-DUR,KLOR-CON) 10 MEQ tablet    Other Relevant Orders    Comprehensive Metabolic Panel    Hyperlipidemia, unspecified hyperlipidemia type        lab today    Relevant Medications    simvastatin (ZOCOR) 5 MG tablet    Other Relevant Orders    Lipid Panel    Diabetes mellitus without complication        cont diet control      Relevant Orders    Hemoglobin A1C    Essential hypertension, malignant        review and cont home meds      Relevant Medications    hydroCHLOROthiazide (HYDRODIURIL) 25 MG tablet    lisinopriL 10 MG tablet    potassium chloride SA  (K-DUR,KLOR-CON) 10 MEQ tablet    Other Relevant Orders    Comprehensive Metabolic Panel    Hyperlipidemia, unspecified hyperlipidemia type        lab today  cont statin therapy    Relevant Medications    simvastatin (ZOCOR) 5 MG tablet    Other Relevant Orders    Lipid Panel           Follow up in about 3 months (around 5/3/2022), or if symptoms worsen or fail to improve.

## 2022-03-29 ENCOUNTER — OFFICE VISIT (OUTPATIENT)
Dept: FAMILY MEDICINE | Facility: CLINIC | Age: 87
End: 2022-03-29
Payer: MEDICARE

## 2022-03-29 VITALS
HEIGHT: 62 IN | WEIGHT: 90 LBS | BODY MASS INDEX: 16.56 KG/M2 | RESPIRATION RATE: 18 BRPM | SYSTOLIC BLOOD PRESSURE: 138 MMHG | DIASTOLIC BLOOD PRESSURE: 71 MMHG | HEART RATE: 111 BPM

## 2022-03-29 DIAGNOSIS — J06.9 UPPER RESPIRATORY TRACT INFECTION, UNSPECIFIED TYPE: Primary | ICD-10-CM

## 2022-03-29 PROCEDURE — 99212 OFFICE O/P EST SF 10 MIN: CPT | Mod: 25,,, | Performed by: NURSE PRACTITIONER

## 2022-03-29 PROCEDURE — 1160F RVW MEDS BY RX/DR IN RCRD: CPT | Mod: ,,, | Performed by: NURSE PRACTITIONER

## 2022-03-29 PROCEDURE — 96372 PR INJECTION,THERAP/PROPH/DIAG2ST, IM OR SUBCUT: ICD-10-PCS | Mod: ,,, | Performed by: NURSE PRACTITIONER

## 2022-03-29 PROCEDURE — 1159F PR MEDICATION LIST DOCUMENTED IN MEDICAL RECORD: ICD-10-PCS | Mod: ,,, | Performed by: NURSE PRACTITIONER

## 2022-03-29 PROCEDURE — 96372 THER/PROPH/DIAG INJ SC/IM: CPT | Mod: ,,, | Performed by: NURSE PRACTITIONER

## 2022-03-29 PROCEDURE — 99212 PR OFFICE/OUTPT VISIT, EST, LEVL II, 10-19 MIN: ICD-10-PCS | Mod: 25,,, | Performed by: NURSE PRACTITIONER

## 2022-03-29 PROCEDURE — 1159F MED LIST DOCD IN RCRD: CPT | Mod: ,,, | Performed by: NURSE PRACTITIONER

## 2022-03-29 PROCEDURE — 1160F PR REVIEW ALL MEDS BY PRESCRIBER/CLIN PHARMACIST DOCUMENTED: ICD-10-PCS | Mod: ,,, | Performed by: NURSE PRACTITIONER

## 2022-03-29 RX ORDER — AZITHROMYCIN 250 MG/1
TABLET, FILM COATED ORAL
Qty: 6 TABLET | Refills: 0 | Status: SHIPPED | OUTPATIENT
Start: 2022-03-29 | End: 2022-04-03

## 2022-03-29 RX ORDER — FLUTICASONE PROPIONATE 50 MCG
1 SPRAY, SUSPENSION (ML) NASAL DAILY
Qty: 16 G | Refills: 3 | Status: SHIPPED | OUTPATIENT
Start: 2022-03-29

## 2022-03-29 RX ORDER — CETIRIZINE HYDROCHLORIDE 10 MG/1
10 TABLET ORAL DAILY
Qty: 30 TABLET | Refills: 11 | Status: SHIPPED | OUTPATIENT
Start: 2022-03-29 | End: 2022-05-05 | Stop reason: SDUPTHER

## 2022-03-29 RX ORDER — BETAMETHASONE SODIUM PHOSPHATE AND BETAMETHASONE ACETATE 3; 3 MG/ML; MG/ML
6 INJECTION, SUSPENSION INTRA-ARTICULAR; INTRALESIONAL; INTRAMUSCULAR; SOFT TISSUE
Status: COMPLETED | OUTPATIENT
Start: 2022-03-29 | End: 2022-03-29

## 2022-03-29 RX ORDER — CEFTRIAXONE 500 MG/1
500 INJECTION, POWDER, FOR SOLUTION INTRAMUSCULAR; INTRAVENOUS
Status: COMPLETED | OUTPATIENT
Start: 2022-03-29 | End: 2022-03-29

## 2022-03-29 RX ADMIN — BETAMETHASONE SODIUM PHOSPHATE AND BETAMETHASONE ACETATE 6 MG: 3; 3 INJECTION, SUSPENSION INTRA-ARTICULAR; INTRALESIONAL; INTRAMUSCULAR; SOFT TISSUE at 11:03

## 2022-03-29 RX ADMIN — CEFTRIAXONE 500 MG: 500 INJECTION, POWDER, FOR SOLUTION INTRAMUSCULAR; INTRAVENOUS at 11:03

## 2022-03-29 NOTE — PROGRESS NOTES
"New clinic note    Gisel Shelby is a 89 y.o. female      Chief Complaint   Patient presents with    URI        Subjective:  Pt presents with sinus congestion, pressure and runny nose. She denies any fever or chills. No loss of taste or smell. No bodyaches or headaches.        Past Medical History:   Diagnosis Date    Hypertension       Family History   Problem Relation Age of Onset    Hypertension Mother     Diabetes Sister     Hypertension Sister     Diabetes Brother     Hypertension Brother       Past Surgical History:   Procedure Laterality Date    APPENDECTOMY        Social History     Socioeconomic History    Marital status: Single   Tobacco Use    Smoking status: Never Smoker    Smokeless tobacco: Never Used   Substance and Sexual Activity    Alcohol use: Never    Drug use: Never    Sexual activity: Not Currently        Review of Systems   Constitutional: Negative for fatigue and fever.   HENT: Positive for nasal congestion and sinus pressure/congestion.    Respiratory: Positive for cough. Negative for shortness of breath.    Cardiovascular: Negative for chest pain.   Neurological: Negative for headaches.        Objective:  /71 (BP Location: Left arm, Patient Position: Sitting, BP Method: Medium (Automatic))   Pulse (!) 111   Resp 18   Ht 5' 2" (1.575 m)   Wt 40.8 kg (90 lb)   BMI 16.46 kg/m²    Physical Exam  Constitutional:       General: She is not in acute distress.     Appearance: Normal appearance.   HENT:      Head: Normocephalic.      Nose: Congestion and rhinorrhea present.   Eyes:      Pupils: Pupils are equal, round, and reactive to light.   Cardiovascular:      Rate and Rhythm: Normal rate and regular rhythm.      Heart sounds: Normal heart sounds. No murmur heard.  Pulmonary:      Effort: Pulmonary effort is normal.      Breath sounds: Normal breath sounds. No wheezing or rhonchi.   Neurological:      General: No focal deficit present.      Mental Status: She is alert " and oriented to person, place, and time.          Assessment/plan:  1. Upper respiratory tract infection, unspecified type         Problem List Items Addressed This Visit    None     Visit Diagnoses     Upper respiratory tract infection, unspecified type    -  Primary    rx sent     Relevant Medications    cefTRIAXone injection 500 mg (Completed)    betamethasone acetate-betamethasone sodium phosphate injection 6 mg (Completed)    cetirizine (ZYRTEC) 10 MG tablet    azithromycin (Z-MIKE) 250 MG tablet    fluticasone propionate (FLONASE) 50 mcg/actuation nasal spray           Follow up if symptoms worsen or fail to improve, for as scheduled .

## 2022-05-05 ENCOUNTER — OFFICE VISIT (OUTPATIENT)
Dept: FAMILY MEDICINE | Facility: CLINIC | Age: 87
End: 2022-05-05
Payer: MEDICARE

## 2022-05-05 VITALS
SYSTOLIC BLOOD PRESSURE: 127 MMHG | HEART RATE: 85 BPM | DIASTOLIC BLOOD PRESSURE: 70 MMHG | RESPIRATION RATE: 16 BRPM | BODY MASS INDEX: 17.3 KG/M2 | WEIGHT: 94 LBS | HEIGHT: 62 IN

## 2022-05-05 DIAGNOSIS — E11.9 DIABETES MELLITUS WITHOUT COMPLICATION: ICD-10-CM

## 2022-05-05 DIAGNOSIS — E78.5 HYPERLIPIDEMIA, UNSPECIFIED HYPERLIPIDEMIA TYPE: ICD-10-CM

## 2022-05-05 DIAGNOSIS — Z01.00 EYE EXAM, ROUTINE: ICD-10-CM

## 2022-05-05 DIAGNOSIS — J30.2 SEASONAL ALLERGIES: ICD-10-CM

## 2022-05-05 DIAGNOSIS — I10 ESSENTIAL HYPERTENSION, MALIGNANT: Primary | ICD-10-CM

## 2022-05-05 DIAGNOSIS — I10 ESSENTIAL HYPERTENSION, MALIGNANT: ICD-10-CM

## 2022-05-05 LAB
ALBUMIN SERPL BCP-MCNC: 3.5 G/DL (ref 3.5–5)
ALBUMIN/GLOB SERPL: 1 {RATIO}
ALP SERPL-CCNC: 65 U/L (ref 55–142)
ALT SERPL W P-5'-P-CCNC: 16 U/L (ref 13–56)
ANION GAP SERPL CALCULATED.3IONS-SCNC: 13 MMOL/L (ref 7–16)
AST SERPL W P-5'-P-CCNC: 13 U/L (ref 15–37)
BILIRUB SERPL-MCNC: 0.7 MG/DL (ref 0–1.2)
BUN SERPL-MCNC: 28 MG/DL (ref 7–18)
BUN/CREAT SERPL: 36 (ref 6–20)
CALCIUM SERPL-MCNC: 9.1 MG/DL (ref 8.5–10.1)
CHLORIDE SERPL-SCNC: 109 MMOL/L (ref 98–107)
CHOLEST SERPL-MCNC: 164 MG/DL (ref 0–200)
CHOLEST/HDLC SERPL: 1.7 {RATIO}
CO2 SERPL-SCNC: 22 MMOL/L (ref 21–32)
CREAT SERPL-MCNC: 0.77 MG/DL (ref 0.55–1.02)
EST. AVERAGE GLUCOSE BLD GHB EST-MCNC: 107 MG/DL
GLOBULIN SER-MCNC: 3.5 G/DL (ref 2–4)
GLUCOSE SERPL-MCNC: 84 MG/DL (ref 74–106)
HBA1C MFR BLD HPLC: 5.8 % (ref 4.5–6.6)
HDLC SERPL-MCNC: 96 MG/DL (ref 40–60)
LDLC SERPL CALC-MCNC: 60 MG/DL
LDLC/HDLC SERPL: 0.6 {RATIO}
NONHDLC SERPL-MCNC: 68 MG/DL
POTASSIUM SERPL-SCNC: 4.1 MMOL/L (ref 3.5–5.1)
PROT SERPL-MCNC: 7 G/DL (ref 6.4–8.2)
SODIUM SERPL-SCNC: 140 MMOL/L (ref 136–145)
TRIGL SERPL-MCNC: 42 MG/DL (ref 35–150)
VLDLC SERPL-MCNC: 8 MG/DL

## 2022-05-05 PROCEDURE — 1160F PR REVIEW ALL MEDS BY PRESCRIBER/CLIN PHARMACIST DOCUMENTED: ICD-10-PCS | Mod: ,,, | Performed by: NURSE PRACTITIONER

## 2022-05-05 PROCEDURE — 3288F FALL RISK ASSESSMENT DOCD: CPT | Mod: ,,, | Performed by: NURSE PRACTITIONER

## 2022-05-05 PROCEDURE — 83036 HEMOGLOBIN A1C: ICD-10-PCS | Mod: ,,, | Performed by: CLINICAL MEDICAL LABORATORY

## 2022-05-05 PROCEDURE — 1159F MED LIST DOCD IN RCRD: CPT | Mod: ,,, | Performed by: NURSE PRACTITIONER

## 2022-05-05 PROCEDURE — 1101F PR PT FALLS ASSESS DOC 0-1 FALLS W/OUT INJ PAST YR: ICD-10-PCS | Mod: ,,, | Performed by: NURSE PRACTITIONER

## 2022-05-05 PROCEDURE — 80053 COMPREHEN METABOLIC PANEL: CPT | Mod: ,,, | Performed by: CLINICAL MEDICAL LABORATORY

## 2022-05-05 PROCEDURE — 1160F RVW MEDS BY RX/DR IN RCRD: CPT | Mod: ,,, | Performed by: NURSE PRACTITIONER

## 2022-05-05 PROCEDURE — 3288F PR FALLS RISK ASSESSMENT DOCUMENTED: ICD-10-PCS | Mod: ,,, | Performed by: NURSE PRACTITIONER

## 2022-05-05 PROCEDURE — 80053 COMPREHENSIVE METABOLIC PANEL: ICD-10-PCS | Mod: ,,, | Performed by: CLINICAL MEDICAL LABORATORY

## 2022-05-05 PROCEDURE — 99212 PR OFFICE/OUTPT VISIT, EST, LEVL II, 10-19 MIN: ICD-10-PCS | Mod: ,,, | Performed by: NURSE PRACTITIONER

## 2022-05-05 PROCEDURE — 80061 LIPID PANEL: CPT | Mod: ,,, | Performed by: CLINICAL MEDICAL LABORATORY

## 2022-05-05 PROCEDURE — 80061 LIPID PANEL: ICD-10-PCS | Mod: ,,, | Performed by: CLINICAL MEDICAL LABORATORY

## 2022-05-05 PROCEDURE — 83036 HEMOGLOBIN GLYCOSYLATED A1C: CPT | Mod: ,,, | Performed by: CLINICAL MEDICAL LABORATORY

## 2022-05-05 PROCEDURE — 1101F PT FALLS ASSESS-DOCD LE1/YR: CPT | Mod: ,,, | Performed by: NURSE PRACTITIONER

## 2022-05-05 PROCEDURE — 1159F PR MEDICATION LIST DOCUMENTED IN MEDICAL RECORD: ICD-10-PCS | Mod: ,,, | Performed by: NURSE PRACTITIONER

## 2022-05-05 PROCEDURE — 99212 OFFICE O/P EST SF 10 MIN: CPT | Mod: ,,, | Performed by: NURSE PRACTITIONER

## 2022-05-05 RX ORDER — HYDROCHLOROTHIAZIDE 25 MG/1
25 TABLET ORAL DAILY
Qty: 90 TABLET | Refills: 1 | Status: SHIPPED | OUTPATIENT
Start: 2022-05-05 | End: 2023-05-09 | Stop reason: SDUPTHER

## 2022-05-05 RX ORDER — MEGESTROL ACETATE 40 MG/1
40 TABLET ORAL DAILY
Qty: 90 TABLET | Refills: 1 | Status: SHIPPED | OUTPATIENT
Start: 2022-05-05 | End: 2022-10-25

## 2022-05-05 RX ORDER — POTASSIUM CHLORIDE 750 MG/1
10 TABLET, EXTENDED RELEASE ORAL DAILY
Qty: 90 TABLET | Refills: 1 | Status: SHIPPED | OUTPATIENT
Start: 2022-05-05 | End: 2022-07-18 | Stop reason: SDUPTHER

## 2022-05-05 RX ORDER — LISINOPRIL 10 MG/1
10 TABLET ORAL DAILY
Qty: 90 TABLET | Refills: 1 | Status: SHIPPED | OUTPATIENT
Start: 2022-05-05 | End: 2022-07-18 | Stop reason: SDUPTHER

## 2022-05-05 RX ORDER — SIMVASTATIN 5 MG/1
5 TABLET, FILM COATED ORAL NIGHTLY
Qty: 90 TABLET | Refills: 1 | Status: SHIPPED | OUTPATIENT
Start: 2022-05-05 | End: 2022-07-18 | Stop reason: SDUPTHER

## 2022-05-05 RX ORDER — TRAMADOL HYDROCHLORIDE 50 MG/1
50 TABLET ORAL EVERY 12 HOURS PRN
Qty: 60 TABLET | Refills: 2 | Status: SHIPPED | OUTPATIENT
Start: 2022-05-05 | End: 2022-08-11 | Stop reason: SDUPTHER

## 2022-05-05 RX ORDER — CETIRIZINE HYDROCHLORIDE 10 MG/1
10 TABLET ORAL DAILY
Qty: 90 TABLET | Refills: 1 | Status: SHIPPED | OUTPATIENT
Start: 2022-05-05 | End: 2022-10-31 | Stop reason: SDUPTHER

## 2022-05-05 NOTE — PROGRESS NOTES
"New clinic note    Gisel Shelby is a 89 y.o. female      Chief Complaint   Patient presents with    Follow-up    Hypertension    Diabetes        Subjective:  Pt presents for routine follow up with lab and med refills. She has gained 4 pounds since last appt. She reports the megace is helping with her appetite. She denies any pain or discomfort. No sob or cp.        Past Medical History:   Diagnosis Date    Hypertension       Family History   Problem Relation Age of Onset    Hypertension Mother     Diabetes Sister     Hypertension Sister     Diabetes Brother     Hypertension Brother       Past Surgical History:   Procedure Laterality Date    APPENDECTOMY        Social History     Socioeconomic History    Marital status: Single   Tobacco Use    Smoking status: Never Smoker    Smokeless tobacco: Never Used   Substance and Sexual Activity    Alcohol use: Never    Drug use: Never    Sexual activity: Not Currently        Review of Systems   Constitutional: Negative for fatigue and fever.   HENT: Negative for nasal congestion and sore throat.    Eyes: Negative for visual disturbance.   Respiratory: Negative for chest tightness and shortness of breath.    Cardiovascular: Negative for chest pain and leg swelling.   Gastrointestinal: Negative for abdominal pain, change in bowel habit and change in bowel habit.   Endocrine: Negative for polydipsia, polyphagia and polyuria.   Genitourinary: Negative for dysuria and hematuria.   Musculoskeletal: Negative for back pain and leg pain.   Neurological: Negative for dizziness, syncope, weakness and light-headedness.        Objective:  /70 (BP Location: Left arm, Patient Position: Sitting, BP Method: Small (Automatic))   Pulse 85   Resp 16   Ht 5' 2" (1.575 m)   Wt 42.6 kg (94 lb)   BMI 17.19 kg/m²    Physical Exam  Constitutional:       General: She is not in acute distress.     Appearance: Normal appearance.   HENT:      Head: Normocephalic.   Eyes:     "  Pupils: Pupils are equal, round, and reactive to light.   Cardiovascular:      Rate and Rhythm: Normal rate and regular rhythm.      Heart sounds: Normal heart sounds. No murmur heard.  Pulmonary:      Effort: Pulmonary effort is normal.      Breath sounds: Normal breath sounds. No wheezing or rhonchi.   Abdominal:      General: Bowel sounds are normal. There is no distension.      Hernia: No hernia is present.   Musculoskeletal:         General: No swelling or tenderness.      Right lower leg: No edema.      Left lower leg: No edema.   Skin:     General: Skin is warm and dry.   Neurological:      General: No focal deficit present.      Mental Status: She is alert and oriented to person, place, and time.          Assessment/plan:  1. Essential hypertension, malignant    2. Essential hypertension, malignant    3. Hyperlipidemia, unspecified hyperlipidemia type    4. Diabetes mellitus without complication    5. Hyperlipidemia, unspecified hyperlipidemia type    6. Eye exam, routine    7. Seasonal allergies         Problem List Items Addressed This Visit    None     Visit Diagnoses     Essential hypertension, malignant    -  Primary    review and cont home meds  bp appears well controlled     Relevant Medications    lisinopriL 10 MG tablet    hydroCHLOROthiazide (HYDRODIURIL) 25 MG tablet    potassium chloride SA (K-DUR,KLOR-CON) 10 MEQ tablet    Other Relevant Orders    Comprehensive Metabolic Panel    Microalbumin/Creatinine Ratio, Urine    Essential hypertension, malignant        Relevant Medications    lisinopriL 10 MG tablet    hydroCHLOROthiazide (HYDRODIURIL) 25 MG tablet    potassium chloride SA (K-DUR,KLOR-CON) 10 MEQ tablet    Other Relevant Orders    Comprehensive Metabolic Panel    Microalbumin/Creatinine Ratio, Urine    Hyperlipidemia, unspecified hyperlipidemia type        Relevant Medications    simvastatin (ZOCOR) 5 MG tablet    Other Relevant Orders    Lipid Panel    Diabetes mellitus without  complication        pt currently diet controlled  on no routine dm meds     Relevant Orders    Hemoglobin A1C    Microalbumin/Creatinine Ratio, Urine    Hyperlipidemia, unspecified hyperlipidemia type        lab today  cont statin therapy    Relevant Medications    simvastatin (ZOCOR) 5 MG tablet    Other Relevant Orders    Lipid Panel    Eye exam, routine        Relevant Orders    Ambulatory referral/consult to Optometry    Seasonal allergies        Relevant Medications    cetirizine (ZYRTEC) 10 MG tablet           Follow up in about 3 months (around 8/5/2022), or if symptoms worsen or fail to improve.

## 2022-05-11 ENCOUNTER — CLINICAL SUPPORT (OUTPATIENT)
Dept: FAMILY MEDICINE | Facility: CLINIC | Age: 87
End: 2022-05-11
Payer: MEDICARE

## 2022-05-11 DIAGNOSIS — Z23 NEED FOR VACCINATION: Primary | ICD-10-CM

## 2022-05-11 PROCEDURE — 0064A COVID-19, MRNA, LNP-S, PF, 100 MCG/0.25 ML DOSE VACCINE (MODERNA BOOSTER): CPT | Mod: ,,, | Performed by: NURSE PRACTITIONER

## 2022-05-11 PROCEDURE — 91306 COVID-19, MRNA, LNP-S, PF, 100 MCG/0.25 ML DOSE VACCINE (MODERNA BOOSTER): ICD-10-PCS | Mod: ,,, | Performed by: NURSE PRACTITIONER

## 2022-05-11 PROCEDURE — 91306 COVID-19, MRNA, LNP-S, PF, 100 MCG/0.25 ML DOSE VACCINE (MODERNA BOOSTER): CPT | Mod: ,,, | Performed by: NURSE PRACTITIONER

## 2022-05-11 PROCEDURE — 0064A COVID-19, MRNA, LNP-S, PF, 100 MCG/0.25 ML DOSE VACCINE (MODERNA BOOSTER): ICD-10-PCS | Mod: ,,, | Performed by: NURSE PRACTITIONER

## 2022-06-20 ENCOUNTER — OFFICE VISIT (OUTPATIENT)
Dept: FAMILY MEDICINE | Facility: CLINIC | Age: 87
End: 2022-06-20
Payer: MEDICARE

## 2022-06-20 VITALS
WEIGHT: 91.63 LBS | SYSTOLIC BLOOD PRESSURE: 145 MMHG | BODY MASS INDEX: 16.86 KG/M2 | HEIGHT: 62 IN | DIASTOLIC BLOOD PRESSURE: 76 MMHG | RESPIRATION RATE: 16 BRPM | HEART RATE: 73 BPM

## 2022-06-20 DIAGNOSIS — M79.675 GREAT TOE PAIN, LEFT: Primary | ICD-10-CM

## 2022-06-20 PROCEDURE — 1160F RVW MEDS BY RX/DR IN RCRD: CPT | Mod: ,,, | Performed by: NURSE PRACTITIONER

## 2022-06-20 PROCEDURE — 99213 OFFICE O/P EST LOW 20 MIN: CPT | Mod: ,,, | Performed by: NURSE PRACTITIONER

## 2022-06-20 PROCEDURE — 1159F MED LIST DOCD IN RCRD: CPT | Mod: ,,, | Performed by: NURSE PRACTITIONER

## 2022-06-20 PROCEDURE — 1160F PR REVIEW ALL MEDS BY PRESCRIBER/CLIN PHARMACIST DOCUMENTED: ICD-10-PCS | Mod: ,,, | Performed by: NURSE PRACTITIONER

## 2022-06-20 PROCEDURE — 99213 PR OFFICE/OUTPT VISIT, EST, LEVL III, 20-29 MIN: ICD-10-PCS | Mod: ,,, | Performed by: NURSE PRACTITIONER

## 2022-06-20 PROCEDURE — 1159F PR MEDICATION LIST DOCUMENTED IN MEDICAL RECORD: ICD-10-PCS | Mod: ,,, | Performed by: NURSE PRACTITIONER

## 2022-06-20 NOTE — PROGRESS NOTES
MILLIE King   [facility]ly [facility], later [facility] [place] [address] [place] [address]  [phone]      PATIENT NAME: Gisel Shelby  : 1925  DATE: 22  MRN: [id number]      Billing Provider: MILLIE King  Level of Service:   Patient PCP Information     Provider PCP Type    MILLIE King [facility]          Reason for Visit / Chief Complaint: Toe Injury (Hit left great toe on stool Wednesday-states it didn't bleed or break the skin but she is concerned because she is diabetic and it is still sore to touch)       Update PCP  Update Chief Complaint         History of Present Illness / Problem Focused Workflow     Gisel Shelby presents to the clinic with Toe Injury (Hit left great toe on stool Wednesday-states it didn't bleed or break the skin but she is concerned because she is diabetic and it is still sore to touch)     Pt presents for evaluation of left great toe pain. She reports she hit her toe on a stool getting into bed last week. She reports her toe is less sore today. She was just concerned since she is diabetic. There is no redness or irritation or bruising noted. Toe is non tender to palpation. Nail does not appear damaged. Pt is able to bear weight.     Recommend prn epson salt soaks, and sample of CeraVe ointment given       Review of Systems     Review of Systems   Musculoskeletal: Negative for gait problem and leg pain.        Left great toe injury        Medical / Social / Family History     Past Medical History:   Diagnosis Date    Hypertension        Past Surgical History:   Procedure Laterality Date    APPENDECTOMY         Social History  Ms. Shelby  reports that she has never smoked. She has never used smokeless tobacco. She reports that she does not drink alcohol and does not use drugs.    Family History  Ms. Shelby's family history includes Diabetes in her brother and sister;  Hypertension in her brother, mother, and sister.    Medications and Allergies     Medications  Outpatient Medications Marked as Taking for the 6/20/22 encounter (Office Visit) with MILLIE King   Medication Sig Dispense Refill    alcohol swabs (BD ALCOHOL SWABS) PadM Apply 3 each topically once daily. 400 each 3    aspirin (ECOTRIN) 81 MG EC tablet Take 81 mg by mouth once daily.      blood glucose control, low (TRUE METRIX LEVEL 1) Soln 1 application by NOT APPLICABLE route daily as needed (meter contol). 1 each 3    blood sugar diagnostic (TRUE METRIX GLUCOSE TEST STRIP) Strp Apply 100 strips topically 2 (two) times a day. 100 strip 3    blood-glucose meter (TRUE METRIX GLUCOSE METER) kit Monitor glucose levels twice daily and prn 1 each 3    cetirizine (ZYRTEC) 10 MG tablet Take 1 tablet (10 mg total) by mouth once daily. 90 tablet 1    fluticasone propionate (FLONASE) 50 mcg/actuation nasal spray 1 spray (50 mcg total) by Each Nostril route once daily. 16 g 3    hydroCHLOROthiazide (HYDRODIURIL) 25 MG tablet Take 1 tablet (25 mg total) by mouth once daily. 90 tablet 1    lancets (TRUEPLUS LANCETS) 33 gauge Misc Apply 1 lancet topically 2 (two) times a day. 100 each 3    lisinopriL 10 MG tablet Take 1 tablet (10 mg total) by mouth once daily. 90 tablet 1    megestroL (MEGACE) 40 MG Tab Take 1 tablet (40 mg total) by mouth once daily. 90 tablet 1    potassium chloride SA (K-DUR,KLOR-CON) 10 MEQ tablet Take 1 tablet (10 mEq total) by mouth once daily. 90 tablet 1    simvastatin (ZOCOR) 5 MG tablet Take 1 tablet (5 mg total) by mouth every evening. 90 tablet 1    traMADoL (ULTRAM) 50 mg tablet Take 1 tablet (50 mg total) by mouth every 12 (twelve) hours as needed for Pain. 60 tablet 2       Allergies  Review of patient's allergies indicates:  No Known Allergies    Physical Examination     Vitals:    06/20/22 1009   BP: (!) 145/76   Pulse: 73   Resp: 16     Physical Exam  Constitutional:        General: She is not in acute distress.  Cardiovascular:      Heart sounds: Normal heart sounds.   Pulmonary:      Breath sounds: Normal breath sounds.   Feet:      Comments: No redness or bruising noted. No tenderness to palpation. No nail damage noted. On Left great toe          Assessment and Plan (including Health Maintenance)      Problem List  Smart Sets  Document Outside HM   :    Plan:         Health Maintenance Due   Topic Date Due    Eye Exam  Never done    Shingles Vaccine (2 of 3) 10/01/2018    Diabetes Urine Screening  02/18/2022       Problem List Items Addressed This Visit    None     Visit Diagnoses     Great toe pain, right    -  Primary    toe does not appear to be injured. nail does note appear to be irritated.  CeraVe ointment given  epison salt soaks prn           Health Maintenance Topics with due status: Not Due       Topic Last Completion Date    TETANUS VACCINE 04/19/2017    Lipid Panel 05/05/2022    Hemoglobin A1c 05/05/2022       Future Appointments   Date Time Provider Department Center   8/4/2022  9:20 AM MILLIE King [facility]   9/22/2022 10:00 AM AWV NURSE, [facility] FAMILY MEDICINE [facility] [place]            Signature:  MILLIE King  [facility] [address] [place] [address]  [phone]    Date of encounter: 6/20/22

## 2022-06-20 NOTE — PROGRESS NOTES
Hit left great toe on stool Wednesday-states it didn't bleed or break the skin but she is concerned because she is diabetic and it is still sore to touch

## 2022-06-27 ENCOUNTER — OFFICE VISIT (OUTPATIENT)
Dept: FAMILY MEDICINE | Facility: CLINIC | Age: 87
End: 2022-06-27
Payer: MEDICARE

## 2022-06-27 VITALS
HEIGHT: 62 IN | SYSTOLIC BLOOD PRESSURE: 159 MMHG | OXYGEN SATURATION: 99 % | BODY MASS INDEX: 16.31 KG/M2 | WEIGHT: 88.63 LBS | RESPIRATION RATE: 18 BRPM | HEART RATE: 93 BPM | DIASTOLIC BLOOD PRESSURE: 86 MMHG

## 2022-06-27 DIAGNOSIS — M25.561 ACUTE PAIN OF RIGHT KNEE: ICD-10-CM

## 2022-06-27 DIAGNOSIS — M17.11 PRIMARY OSTEOARTHRITIS OF RIGHT KNEE: Primary | ICD-10-CM

## 2022-06-27 PROCEDURE — 96372 THER/PROPH/DIAG INJ SC/IM: CPT | Mod: ,,, | Performed by: NURSE PRACTITIONER

## 2022-06-27 PROCEDURE — 99213 PR OFFICE/OUTPT VISIT, EST, LEVL III, 20-29 MIN: ICD-10-PCS | Mod: 25,,, | Performed by: NURSE PRACTITIONER

## 2022-06-27 PROCEDURE — 99213 OFFICE O/P EST LOW 20 MIN: CPT | Mod: 25,,, | Performed by: NURSE PRACTITIONER

## 2022-06-27 PROCEDURE — 96372 PR INJECTION,THERAP/PROPH/DIAG2ST, IM OR SUBCUT: ICD-10-PCS | Mod: ,,, | Performed by: NURSE PRACTITIONER

## 2022-06-27 RX ORDER — KETOROLAC TROMETHAMINE 30 MG/ML
30 INJECTION, SOLUTION INTRAMUSCULAR; INTRAVENOUS
Status: COMPLETED | OUTPATIENT
Start: 2022-06-27 | End: 2022-06-27

## 2022-06-27 RX ORDER — BETAMETHASONE SODIUM PHOSPHATE AND BETAMETHASONE ACETATE 3; 3 MG/ML; MG/ML
3 INJECTION, SUSPENSION INTRA-ARTICULAR; INTRALESIONAL; INTRAMUSCULAR; SOFT TISSUE ONCE
Status: COMPLETED | OUTPATIENT
Start: 2022-06-27 | End: 2022-06-27

## 2022-06-27 RX ORDER — DICLOFENAC SODIUM 10 MG/G
2 GEL TOPICAL DAILY
Qty: 150 G | Refills: 1 | Status: SHIPPED | OUTPATIENT
Start: 2022-06-27

## 2022-06-27 RX ORDER — PREDNISONE 10 MG/1
TABLET ORAL
Qty: 48 TABLET | Refills: 0 | Status: SHIPPED | OUTPATIENT
Start: 2022-06-27 | End: 2022-08-04 | Stop reason: ALTCHOICE

## 2022-06-27 RX ADMIN — BETAMETHASONE SODIUM PHOSPHATE AND BETAMETHASONE ACETATE 3 MG: 3; 3 INJECTION, SUSPENSION INTRA-ARTICULAR; INTRALESIONAL; INTRAMUSCULAR; SOFT TISSUE at 11:06

## 2022-06-27 RX ADMIN — KETOROLAC TROMETHAMINE 30 MG: 30 INJECTION, SOLUTION INTRAMUSCULAR; INTRAVENOUS at 11:06

## 2022-06-27 NOTE — PROGRESS NOTES
Carlotta Cardona DNP   [address] [place] [address]     PATIENT NAME: Gisel Shelby  : 1925  DATE: 22  MRN: [id number]      Billing Provider: Carlotta Cardona DNP  Level of Service:   Patient PCP Information     Provider PCP Type    MILLIE King General          Reason for Visit / Chief Complaint: right knee pain       Update PCP  Update Chief Complaint         History of Present Illness / Problem Focused Workflow     Gisel Shelby presents to the clinic with right knee pain     HPI    Review of Systems     Review of Systems     Medical / Social / Family History     Past Medical History:   Diagnosis Date    Hypertension        Past Surgical History:   Procedure Laterality Date    APPENDECTOMY         Social History  Ms.  reports that she has never smoked. She has never used smokeless tobacco. She reports that she does not drink alcohol and does not use drugs.    Family History  Ms.'s family history includes Diabetes in her brother and sister; Hypertension in her brother, mother, and sister.    Medications and Allergies     Medications  Outpatient Medications Marked as Taking for the 22 encounter (Office Visit) with Carlotta Cardona DNP   Medication Sig Dispense Refill    alcohol swabs (BD ALCOHOL SWABS) PadM Apply 3 each topically once daily. 400 each 3    aspirin (ECOTRIN) 81 MG EC tablet Take 81 mg by mouth once daily.      blood glucose control, low (TRUE METRIX LEVEL 1) Soln 1 application by NOT APPLICABLE route daily as needed (meter contol). 1 each 3    blood sugar diagnostic (TRUE METRIX GLUCOSE TEST STRIP) Strp Apply 100 strips topically 2 (two) times a day. 100 strip 3    blood-glucose meter (TRUE METRIX GLUCOSE METER) kit Monitor glucose levels twice daily and prn 1 each 3    cetirizine (ZYRTEC) 10 MG tablet Take 1 tablet (10 mg total) by mouth once daily. 90 tablet 1    fluticasone propionate (FLONASE) 50 mcg/actuation nasal spray 1 spray  (50 mcg total) by Each Nostril route once daily. 16 g 3    hydroCHLOROthiazide (HYDRODIURIL) 25 MG tablet Take 1 tablet (25 mg total) by mouth once daily. 90 tablet 1    lancets (TRUEPLUS LANCETS) 33 gauge Misc Apply 1 lancet topically 2 (two) times a day. 100 each 3    lisinopriL 10 MG tablet Take 1 tablet (10 mg total) by mouth once daily. 90 tablet 1    megestroL (MEGACE) 40 MG Tab Take 1 tablet (40 mg total) by mouth once daily. 90 tablet 1    potassium chloride SA (K-DUR,KLOR-CON) 10 MEQ tablet Take 1 tablet (10 mEq total) by mouth once daily. 90 tablet 1    simvastatin (ZOCOR) 5 MG tablet Take 1 tablet (5 mg total) by mouth every evening. 90 tablet 1    traMADoL (ULTRAM) 50 mg tablet Take 1 tablet (50 mg total) by mouth every 12 (twelve) hours as needed for Pain. 60 tablet 2     Current Facility-Administered Medications for the 6/27/22 encounter (Office Visit) with Carlotta Cardona DNP   Medication Dose Route Frequency Provider Last Rate Last Admin    [COMPLETED] betamethasone acetate-betamethasone sodium phosphate injection 3 mg  3 mg Intramuscular Once Carlotta Cardona DNP   3 mg at 06/27/22 1101    [COMPLETED] ketorolac injection 30 mg  30 mg Intramuscular 1 time in Clinic/HOD Carlotta Cardona DNP   30 mg at 06/27/22 1101       Allergies  Review of patient's allergies indicates:  No Known Allergies    Physical Examination     Vitals:    06/27/22 0947   BP: (!) 159/86   Pulse: 93   Resp: 18     Physical Exam  Constitutional:       General: She is not in acute distress.     Appearance: Normal appearance.   HENT:      Head: Normocephalic.   Eyes:      Pupils: Pupils are equal, round, and reactive to light.   Cardiovascular:      Rate and Rhythm: Normal rate and regular rhythm.      Pulses: Normal pulses.      Heart sounds: Normal heart sounds. No murmur heard.  Pulmonary:      Effort: Pulmonary effort is normal.      Breath sounds: Normal breath sounds. No wheezing or rhonchi.   Abdominal:       General: Bowel sounds are normal. There is no distension.      Hernia: No hernia is present.   Musculoskeletal:         General: Tenderness and deformity present. No swelling.      Right lower leg: No edema.      Left lower leg: No edema.      Comments: Rt knee pain. Minimal swelling. More medial tenderness   Skin:     General: Skin is warm and dry.   Neurological:      General: No focal deficit present.      Mental Status: She is alert and oriented to person, place, and time.   Psychiatric:         Mood and Affect: Mood normal.         Behavior: Behavior normal.         Thought Content: Thought content normal.         Judgment: Judgment normal.          Assessment and Plan (including Health Maintenance)      Problem List  Smart Sets  Document Outside HM   :    Plan:         Health Maintenance Due   Topic Date Due    Eye Exam  Never done    Shingles Vaccine (2 of 3) 10/01/2018    Diabetes Urine Screening  02/18/2022       Problem List Items Addressed This Visit        Orthopedic    Primary osteoarthritis of right knee - Primary          Health Maintenance Topics with due status: Not Due       Topic Last Completion Date    TETANUS VACCINE 04/19/2017    Lipid Panel 05/05/2022    Hemoglobin A1c 05/05/2022       Future Appointments   Date Time Provider Department Center   8/4/2022  9:20 AM MILLIE King [facility] [place]   9/22/2022 10:00 AM AWV NURSE [facility] FAMILY MEDICINE [facility] [place]            Signature:  Carlotta Cardona DNP      [address] [place] [address]    Date of encounter: 6/27/22

## 2022-06-28 PROBLEM — M25.561 ACUTE PAIN OF RIGHT KNEE: Status: ACTIVE | Noted: 2022-06-28

## 2022-07-12 ENCOUNTER — TELEPHONE (OUTPATIENT)
Dept: FAMILY MEDICINE | Facility: CLINIC | Age: 87
End: 2022-07-12

## 2022-07-14 NOTE — TELEPHONE ENCOUNTER
7/14/22-spoke with pt son, Miguel A . States pt was not home. States she had her cardiology appt in [place] today. Informed him of her follow up visit with KIMBERLY Snell. Gave appt day and time vu. Also instructed him to bring her meds with her to visit.abida Mobley      8/12/22-pt was seen by pcp on scheduled appt 7/18/22.No readmission to hospital noted.Nayana

## 2022-07-18 ENCOUNTER — OFFICE VISIT (OUTPATIENT)
Dept: FAMILY MEDICINE | Facility: CLINIC | Age: 87
End: 2022-07-18
Payer: MEDICARE

## 2022-07-18 VITALS
DIASTOLIC BLOOD PRESSURE: 76 MMHG | HEIGHT: 62 IN | RESPIRATION RATE: 16 BRPM | WEIGHT: 89.19 LBS | BODY MASS INDEX: 16.41 KG/M2 | HEART RATE: 91 BPM | SYSTOLIC BLOOD PRESSURE: 132 MMHG

## 2022-07-18 DIAGNOSIS — I10 ESSENTIAL HYPERTENSION, MALIGNANT: ICD-10-CM

## 2022-07-18 DIAGNOSIS — E78.5 HYPERLIPIDEMIA, UNSPECIFIED HYPERLIPIDEMIA TYPE: ICD-10-CM

## 2022-07-18 DIAGNOSIS — Z09 HOSPITAL DISCHARGE FOLLOW-UP: Primary | ICD-10-CM

## 2022-07-18 PROCEDURE — 1160F PR REVIEW ALL MEDS BY PRESCRIBER/CLIN PHARMACIST DOCUMENTED: ICD-10-PCS | Mod: ,,, | Performed by: NURSE PRACTITIONER

## 2022-07-18 PROCEDURE — 1160F RVW MEDS BY RX/DR IN RCRD: CPT | Mod: ,,, | Performed by: NURSE PRACTITIONER

## 2022-07-18 PROCEDURE — 1159F PR MEDICATION LIST DOCUMENTED IN MEDICAL RECORD: ICD-10-PCS | Mod: ,,, | Performed by: NURSE PRACTITIONER

## 2022-07-18 PROCEDURE — 99213 OFFICE O/P EST LOW 20 MIN: CPT | Mod: ,,, | Performed by: NURSE PRACTITIONER

## 2022-07-18 PROCEDURE — 1159F MED LIST DOCD IN RCRD: CPT | Mod: ,,, | Performed by: NURSE PRACTITIONER

## 2022-07-18 PROCEDURE — 99213 PR OFFICE/OUTPT VISIT, EST, LEVL III, 20-29 MIN: ICD-10-PCS | Mod: ,,, | Performed by: NURSE PRACTITIONER

## 2022-07-18 RX ORDER — SIMVASTATIN 5 MG/1
5 TABLET, FILM COATED ORAL NIGHTLY
Qty: 90 TABLET | Refills: 1 | Status: SHIPPED | OUTPATIENT
Start: 2022-07-18 | End: 2022-08-04 | Stop reason: SDUPTHER

## 2022-07-18 RX ORDER — POTASSIUM CHLORIDE 750 MG/1
10 TABLET, EXTENDED RELEASE ORAL DAILY
Qty: 90 TABLET | Refills: 1 | Status: SHIPPED | OUTPATIENT
Start: 2022-07-18 | End: 2022-08-04 | Stop reason: SDUPTHER

## 2022-07-18 RX ORDER — LISINOPRIL 10 MG/1
10 TABLET ORAL DAILY
Qty: 90 TABLET | Refills: 1 | Status: SHIPPED | OUTPATIENT
Start: 2022-07-18 | End: 2022-08-04 | Stop reason: SDUPTHER

## 2022-07-18 NOTE — PROGRESS NOTES
MILLIE King   R[facility] TATYANA MCLAUGHLIN [address] [place] [address]  [phone]      PATIENT NAME: Gisel Shelby  : 1925  DATE: 22  MRN: [id number]      Billing Provider: MILLIE King  Level of Service:   Patient PCP Information     Provider PCP Type    MILLIE King General          Reason for Visit / Chief Complaint: Follow-up (F/u hospitalization at [facility])       Update PCP  Update Chief Complaint         History of Present Illness / Problem Focused Workflow     Gisel Shelby presents to the clinic with Follow-up (F/u hospitalization at [facility])     Pt presents for hospital d/c follow up. She was seen recently at [facility] for chest pain. Medical record reviewed, NSTEMI. No invasive cardiac procedures due to pt age and risk. She is on medical management. She denies any recent CP, pressure or discomfort. No sob or dizziness. She has an Rx for NTG but has not had to use it. Discussed how to use it with pt and daughter.     Will follow up with cards on 2022  Follow up with me 2022      Review of Systems     Review of Systems   Respiratory: Negative for cough and shortness of breath.    Cardiovascular: Negative for chest pain and leg swelling.   Neurological: Negative for weakness.        Medical / Social / Family History     Past Medical History:   Diagnosis Date    Hypertension        Past Surgical History:   Procedure Laterality Date    APPENDECTOMY         Social History  Ms. Shelby  reports that she has never smoked. She has never used smokeless tobacco. She reports that she does not drink alcohol and does not use drugs.    Family History  Ms. Shelby's family history includes Diabetes in her brother and sister; Hypertension in her brother, mother, and sister.    Medications and Allergies     Medications  Outpatient Medications Marked as Taking for the 22 encounter  (Office Visit) with MILLIE King   Medication Sig Dispense Refill    alcohol swabs (BD ALCOHOL SWABS) PadM Apply 3 each topically once daily. 400 each 3    aspirin (ECOTRIN) 81 MG EC tablet Take 81 mg by mouth once daily.      blood glucose control, low (TRUE METRIX LEVEL 1) Soln 1 application by NOT APPLICABLE route daily as needed (meter contol). 1 each 3    blood sugar diagnostic (TRUE METRIX GLUCOSE TEST STRIP) Strp Apply 100 strips topically 2 (two) times a day. 100 strip 3    blood-glucose meter (TRUE METRIX GLUCOSE METER) kit Monitor glucose levels twice daily and prn 1 each 3    cetirizine (ZYRTEC) 10 MG tablet Take 1 tablet (10 mg total) by mouth once daily. 90 tablet 1    diclofenac sodium (VOLTAREN) 1 % Gel Apply 2 g topically once daily. 150 g 1    hydroCHLOROthiazide (HYDRODIURIL) 25 MG tablet Take 1 tablet (25 mg total) by mouth once daily. 90 tablet 1    megestroL (MEGACE) 40 MG Tab Take 1 tablet (40 mg total) by mouth once daily. 90 tablet 1    traMADoL (ULTRAM) 50 mg tablet Take 1 tablet (50 mg total) by mouth every 12 (twelve) hours as needed for Pain. 60 tablet 2    [DISCONTINUED] lisinopriL 10 MG tablet Take 1 tablet (10 mg total) by mouth once daily. 90 tablet 1    [DISCONTINUED] potassium chloride SA (K-DUR,KLOR-CON) 10 MEQ tablet Take 1 tablet (10 mEq total) by mouth once daily. 90 tablet 1    [DISCONTINUED] simvastatin (ZOCOR) 5 MG tablet Take 1 tablet (5 mg total) by mouth every evening. 90 tablet 1       Allergies  Review of patient's allergies indicates:  No Known Allergies    Physical Examination     Vitals:    07/18/22 1432   BP: 132/76   Pulse: 91   Resp: 16     Physical Exam  Eyes:      Pupils: Pupils are equal, round, and reactive to light.   Cardiovascular:      Rate and Rhythm: Normal rate and regular rhythm.      Heart sounds: No murmur heard.  Pulmonary:      Breath sounds: Normal breath sounds. No wheezing, rhonchi or rales.   Abdominal:      General:  Bowel sounds are normal.   Musculoskeletal:         General: No swelling.      Cervical back: Normal range of motion and neck supple.   Skin:     General: Skin is warm and dry.   Neurological:      Mental Status: She is alert and oriented to person, place, and time.          Assessment and Plan (including Health Maintenance)      Problem List  Smart Sets  Document Outside HM   :    Plan:         Health Maintenance Due   Topic Date Due    Eye Exam  Never done    Shingles Vaccine (2 of 3) 10/01/2018    Diabetes Urine Screening  02/18/2022       Problem List Items Addressed This Visit    None     Visit Diagnoses     Hospital discharge follow-up    -  Primary    has appt with cards 08/19/2022  follow up with me 08/04/2022  rx sent today     Essential hypertension, malignant        review and cont home meds  bp appears well controlled     Relevant Medications    potassium chloride SA (K-DUR,KLOR-CON) 10 MEQ tablet    lisinopriL 10 MG tablet    Hyperlipidemia, unspecified hyperlipidemia type        lab today  cont statin therapy    Relevant Medications    simvastatin (ZOCOR) 5 MG tablet          Health Maintenance Topics with due status: Not Due       Topic Last Completion Date    TETANUS VACCINE 04/19/2017    Influenza Vaccine 09/21/2021    Lipid Panel 05/05/2022    Hemoglobin A1c 05/05/2022       Future Appointments   Date Time Provider Department Center   8/4/2022  9:20 AM MILLIE King [facility]   9/22/2022 10:00 AM AWV NURSE, [facility] FAMILY MEDICINE [facility] [place]            Signature:  MILLIE King  RUSH TATYANA MARIA [address] [place] [address]  [phone]    Date of encounter: 7/18/22

## 2022-08-04 ENCOUNTER — OFFICE VISIT (OUTPATIENT)
Dept: FAMILY MEDICINE | Facility: CLINIC | Age: 87
End: 2022-08-04
Payer: MEDICARE

## 2022-08-04 VITALS
BODY MASS INDEX: 16.75 KG/M2 | WEIGHT: 91 LBS | DIASTOLIC BLOOD PRESSURE: 68 MMHG | HEART RATE: 81 BPM | RESPIRATION RATE: 16 BRPM | SYSTOLIC BLOOD PRESSURE: 128 MMHG | HEIGHT: 62 IN

## 2022-08-04 DIAGNOSIS — E78.5 HYPERLIPIDEMIA, UNSPECIFIED HYPERLIPIDEMIA TYPE: ICD-10-CM

## 2022-08-04 DIAGNOSIS — E11.9 ENCOUNTER FOR DIABETIC FOOT EXAM: ICD-10-CM

## 2022-08-04 DIAGNOSIS — E11.9 DIABETES MELLITUS WITHOUT COMPLICATION: ICD-10-CM

## 2022-08-04 DIAGNOSIS — I10 ESSENTIAL HYPERTENSION, MALIGNANT: Primary | ICD-10-CM

## 2022-08-04 LAB
ALBUMIN SERPL BCP-MCNC: 3.4 G/DL (ref 3.5–5)
ALBUMIN/GLOB SERPL: 1 {RATIO}
ALP SERPL-CCNC: 65 U/L (ref 55–142)
ALT SERPL W P-5'-P-CCNC: 20 U/L (ref 13–56)
ANION GAP SERPL CALCULATED.3IONS-SCNC: 11 MMOL/L (ref 7–16)
AST SERPL W P-5'-P-CCNC: 17 U/L (ref 15–37)
BILIRUB SERPL-MCNC: 0.5 MG/DL (ref 0–1.2)
BUN SERPL-MCNC: 18 MG/DL (ref 7–18)
BUN/CREAT SERPL: 26 (ref 6–20)
CALCIUM SERPL-MCNC: 9.3 MG/DL (ref 8.5–10.1)
CHLORIDE SERPL-SCNC: 103 MMOL/L (ref 98–107)
CHOLEST SERPL-MCNC: 150 MG/DL (ref 0–200)
CHOLEST/HDLC SERPL: 1.9 {RATIO}
CO2 SERPL-SCNC: 26 MMOL/L (ref 21–32)
CREAT SERPL-MCNC: 0.7 MG/DL (ref 0.55–1.02)
CREAT UR-MCNC: 86 MG/DL (ref 28–219)
EST. AVERAGE GLUCOSE BLD GHB EST-MCNC: 110 MG/DL
GLOBULIN SER-MCNC: 3.4 G/DL (ref 2–4)
GLUCOSE SERPL-MCNC: 70 MG/DL (ref 74–106)
HBA1C MFR BLD HPLC: 5.9 % (ref 4.5–6.6)
HDLC SERPL-MCNC: 81 MG/DL (ref 40–60)
LDLC SERPL CALC-MCNC: 60 MG/DL
LDLC/HDLC SERPL: 0.7 {RATIO}
MICROALBUMIN UR-MCNC: 1.1 MG/DL (ref 0–2.8)
MICROALBUMIN/CREAT RATIO PNL UR: 12.8 MG/G (ref 0–30)
NONHDLC SERPL-MCNC: 69 MG/DL
POTASSIUM SERPL-SCNC: 4.3 MMOL/L (ref 3.5–5.1)
PROT SERPL-MCNC: 6.8 G/DL (ref 6.4–8.2)
SODIUM SERPL-SCNC: 136 MMOL/L (ref 136–145)
TRIGL SERPL-MCNC: 46 MG/DL (ref 35–150)
VLDLC SERPL-MCNC: 9 MG/DL

## 2022-08-04 PROCEDURE — 1160F PR REVIEW ALL MEDS BY PRESCRIBER/CLIN PHARMACIST DOCUMENTED: ICD-10-PCS | Mod: ,,, | Performed by: NURSE PRACTITIONER

## 2022-08-04 PROCEDURE — 83036 HEMOGLOBIN GLYCOSYLATED A1C: CPT | Mod: ,,, | Performed by: CLINICAL MEDICAL LABORATORY

## 2022-08-04 PROCEDURE — 1159F PR MEDICATION LIST DOCUMENTED IN MEDICAL RECORD: ICD-10-PCS | Mod: ,,, | Performed by: NURSE PRACTITIONER

## 2022-08-04 PROCEDURE — 80053 COMPREHEN METABOLIC PANEL: CPT | Mod: ,,, | Performed by: CLINICAL MEDICAL LABORATORY

## 2022-08-04 PROCEDURE — 83036 HEMOGLOBIN A1C: ICD-10-PCS | Mod: ,,, | Performed by: CLINICAL MEDICAL LABORATORY

## 2022-08-04 PROCEDURE — 80053 COMPREHENSIVE METABOLIC PANEL: ICD-10-PCS | Mod: ,,, | Performed by: CLINICAL MEDICAL LABORATORY

## 2022-08-04 PROCEDURE — 99214 PR OFFICE/OUTPT VISIT, EST, LEVL IV, 30-39 MIN: ICD-10-PCS | Mod: ,,, | Performed by: NURSE PRACTITIONER

## 2022-08-04 PROCEDURE — 80061 LIPID PANEL: CPT | Mod: ,,, | Performed by: CLINICAL MEDICAL LABORATORY

## 2022-08-04 PROCEDURE — 1159F MED LIST DOCD IN RCRD: CPT | Mod: ,,, | Performed by: NURSE PRACTITIONER

## 2022-08-04 PROCEDURE — 82570 ASSAY OF URINE CREATININE: CPT | Mod: ,,, | Performed by: CLINICAL MEDICAL LABORATORY

## 2022-08-04 PROCEDURE — 1160F RVW MEDS BY RX/DR IN RCRD: CPT | Mod: ,,, | Performed by: NURSE PRACTITIONER

## 2022-08-04 PROCEDURE — 82043 MICROALBUMIN / CREATININE RATIO URINE: ICD-10-PCS | Mod: ,,, | Performed by: CLINICAL MEDICAL LABORATORY

## 2022-08-04 PROCEDURE — 80061 LIPID PANEL: ICD-10-PCS | Mod: ,,, | Performed by: CLINICAL MEDICAL LABORATORY

## 2022-08-04 PROCEDURE — 82043 UR ALBUMIN QUANTITATIVE: CPT | Mod: ,,, | Performed by: CLINICAL MEDICAL LABORATORY

## 2022-08-04 PROCEDURE — 99214 OFFICE O/P EST MOD 30 MIN: CPT | Mod: ,,, | Performed by: NURSE PRACTITIONER

## 2022-08-04 PROCEDURE — 82570 MICROALBUMIN / CREATININE RATIO URINE: ICD-10-PCS | Mod: ,,, | Performed by: CLINICAL MEDICAL LABORATORY

## 2022-08-04 RX ORDER — LISINOPRIL 10 MG/1
10 TABLET ORAL DAILY
Qty: 90 TABLET | Refills: 1 | Status: SHIPPED | OUTPATIENT
Start: 2022-08-04 | End: 2023-04-04

## 2022-08-04 RX ORDER — SIMVASTATIN 5 MG/1
5 TABLET, FILM COATED ORAL NIGHTLY
Qty: 90 TABLET | Refills: 1 | Status: SHIPPED | OUTPATIENT
Start: 2022-08-04 | End: 2023-02-08

## 2022-08-04 RX ORDER — POTASSIUM CHLORIDE 750 MG/1
10 TABLET, EXTENDED RELEASE ORAL DAILY
Qty: 90 TABLET | Refills: 1 | Status: SHIPPED | OUTPATIENT
Start: 2022-08-04 | End: 2023-06-06 | Stop reason: SDUPTHER

## 2022-08-04 NOTE — PROGRESS NOTES
MILLIE King   RUSH TATYANA MCLAUGHLIN [facility] [address] [place] [address]  [phone]      PATIENT NAME: Gisel Shelby  : 1925  DATE: 22  MRN: [id number]      Billing Provider: MILLIE King  Level of Service:   Patient PCP Information     Provider PCP Type    MILLIE King General          Reason for Visit / Chief Complaint: Follow-up, Hypertension, and Diabetes       Update PCP  Update Chief Complaint         History of Present Illness / Problem Focused Workflow     Gisel Shelby presents to the clinic with Follow-up, Hypertension, and Diabetes     Pt presents for routine follow up with lab and med refills. Pt has gained 2 pounds since her last appt. Overall doing well. No complaints voiced.     bp appears well controlled. She is currently diet controlled DM.       Review of Systems     Review of Systems   Constitutional: Negative for fatigue and fever.   Eyes: Negative for visual disturbance.   Respiratory: Negative for chest tightness and shortness of breath.    Cardiovascular: Negative for chest pain and leg swelling.   Gastrointestinal: Negative for abdominal pain, change in bowel habit and change in bowel habit.   Endocrine: Negative for polydipsia, polyphagia and polyuria.   Genitourinary: Negative for dysuria and hematuria.   Musculoskeletal: Negative for back pain and leg pain.   Neurological: Negative for dizziness, syncope, weakness and light-headedness.        Medical / Social / Family History     Past Medical History:   Diagnosis Date    Hypertension        Past Surgical History:   Procedure Laterality Date    APPENDECTOMY         Social History  Ms. Shelby  reports that she has never smoked. She has never used smokeless tobacco. She reports that she does not drink alcohol and does not use drugs.    Family History  Ms. Shelby's family history includes Diabetes in her  brother and sister; Hypertension in her brother, mother, and sister.    Medications and Allergies     Medications  Outpatient Medications Marked as Taking for the 8/4/22 encounter (Office Visit) with MILLIE King   Medication Sig Dispense Refill    alcohol swabs (BD ALCOHOL SWABS) PadM Apply 3 each topically once daily. 400 each 3    aspirin (ECOTRIN) 81 MG EC tablet Take 81 mg by mouth once daily.      blood sugar diagnostic (TRUE METRIX GLUCOSE TEST STRIP) Strp Apply 100 strips topically 2 (two) times a day. 100 strip 3    blood-glucose meter (TRUE METRIX GLUCOSE METER) kit Monitor glucose levels twice daily and prn 1 each 3    cetirizine (ZYRTEC) 10 MG tablet Take 1 tablet (10 mg total) by mouth once daily. 90 tablet 1    diclofenac sodium (VOLTAREN) 1 % Gel Apply 2 g topically once daily. 150 g 1    fluticasone propionate (FLONASE) 50 mcg/actuation nasal spray 1 spray (50 mcg total) by Each Nostril route once daily. 16 g 3    hydroCHLOROthiazide (HYDRODIURIL) 25 MG tablet Take 1 tablet (25 mg total) by mouth once daily. 90 tablet 1    lancets (TRUEPLUS LANCETS) 33 gauge Misc Apply 1 lancet topically 2 (two) times a day. 100 each 3    megestroL (MEGACE) 40 MG Tab Take 1 tablet (40 mg total) by mouth once daily. 90 tablet 1    traMADoL (ULTRAM) 50 mg tablet Take 1 tablet (50 mg total) by mouth every 12 (twelve) hours as needed for Pain. 60 tablet 2    [DISCONTINUED] lisinopriL 10 MG tablet Take 1 tablet (10 mg total) by mouth once daily. 90 tablet 1    [DISCONTINUED] potassium chloride SA (K-DUR,KLOR-CON) 10 MEQ tablet Take 1 tablet (10 mEq total) by mouth once daily. 90 tablet 1    [DISCONTINUED] simvastatin (ZOCOR) 5 MG tablet Take 1 tablet (5 mg total) by mouth every evening. 90 tablet 1       Allergies  Review of patient's allergies indicates:  No Known Allergies    Physical Examination     Vitals:    08/04/22 0935   BP: 128/68   Pulse: 81   Resp: 16     Physical Exam  Eyes:       Pupils: Pupils are equal, round, and reactive to light.   Cardiovascular:      Rate and Rhythm: Normal rate and regular rhythm.      Pulses:           Dorsalis pedis pulses are 2+ on the right side and 2+ on the left side.        Posterior tibial pulses are 2+ on the right side and 2+ on the left side.      Heart sounds: No murmur heard.  Pulmonary:      Breath sounds: Normal breath sounds. No wheezing, rhonchi or rales.   Abdominal:      General: Bowel sounds are normal.   Musculoskeletal:         General: No swelling.      Cervical back: Normal range of motion and neck supple.   Feet:      Right foot:      Protective Sensation: 10 sites tested. 10 sites sensed.      Skin integrity: Skin integrity normal. No callus or dry skin.      Toenail Condition: Right toenails are normal.      Left foot:      Protective Sensation: 10 sites tested. 10 sites sensed.      Skin integrity: Skin integrity normal. No callus or dry skin.      Toenail Condition: Left toenails are normal.      Comments: Protective Sensation (w/ 10 gram monofilament):  Right: Intact  Left: Intact    Visual Inspection:  Nails Intact - without Evidence of Foot Deformity- Bilateral    Pedal Pulses:   Right: Present  Left: Present    Posterior tibialis:   Right:Present  Left: Present  Skin:     General: Skin is warm and dry.   Neurological:      Mental Status: She is alert and oriented to person, place, and time.          Sodium   Date Value Ref Range Status   05/05/2022 140 136 - 145 mmol/L Final     Potassium   Date Value Ref Range Status   05/05/2022 4.1 3.5 - 5.1 mmol/L Final     Chloride   Date Value Ref Range Status   05/05/2022 109 (H) 98 - 107 mmol/L Final     CO2   Date Value Ref Range Status   05/05/2022 22 21 - 32 mmol/L Final     Anion Gap   Date Value Ref Range Status   05/05/2022 13 7 - 16 mmol/L Final     Glucose   Date Value Ref Range Status   05/05/2022 84 74 - 106 mg/dL Final     BUN   Date Value Ref Range Status   05/05/2022 28 (H) 7 - 18  mg/dL Final     Calcium   Date Value Ref Range Status   05/05/2022 9.1 8.5 - 10.1 mg/dL Final     Total Protein   Date Value Ref Range Status   05/05/2022 7.0 6.4 - 8.2 g/dL Final     Albumin   Date Value Ref Range Status   05/05/2022 3.5 3.5 - 5.0 g/dL Final     A/G Ratio   Date Value Ref Range Status   05/05/2022 1.0  Final     Bilirubin, Total   Date Value Ref Range Status   05/05/2022 0.7 0.0 - 1.2 mg/dL Final     ALT   Date Value Ref Range Status   05/05/2022 16 13 - 56 U/L Final     AST   Date Value Ref Range Status   05/05/2022 13 (L) 15 - 37 U/L Final     eGFR   Date Value Ref Range Status   05/05/2022 74 >=60 mL/min/1.73m² Final     eGFR    Date Value Ref Range Status   11/18/2021 84 >=60 mL/min/1.73m² Final        Cholesterol   Date Value Ref Range Status   05/05/2022 164 0 - 200 mg/dL Final     Comment:       <200 mg/dL:Desirable  200-240 mg/dL:Borderline High  >240 mg/dL:High     HDL Cholesterol   Date Value Ref Range Status   05/05/2022 96 (H) 40 - 60 mg/dL Final     Comment:       <40 mg/dL:Low HDL  40-60 mg/dL:Normal  >60 mg/dL:Desirable     LDL Calculated   Date Value Ref Range Status   05/05/2022 60 mg/dL Final     Comment:     Unable to calculate due to one of the following values:  Cholesterol <5  HDL Cholesterol <5  Triglycerides <10 or >400     Triglycerides   Date Value Ref Range Status   05/05/2022 42 35 - 150 mg/dL Final     Comment:       Normal:<150 mg/dL  Borderline High:150-199 mg/dL  High:200-499 mg/dL  Very High:>=500        Hemoglobin A1C   Date Value Ref Range Status   05/05/2022 5.8 4.5 - 6.6 % Final     Comment:       Normal:               <5.7%  Pre-Diabetic:       5.7% to 6.4%  Diabetic:             >6.4%  Diabetic Goal:     <7%        WBC   Date Value Ref Range Status   11/18/2021 10.58 4.50 - 11.00 K/uL Final     Hemoglobin   Date Value Ref Range Status   11/18/2021 11.8 (L) 12.0 - 16.0 g/dL Final     Hematocrit   Date Value Ref Range Status   11/18/2021 38.8  38.0 - 47.0 % Final     Platelet Count   Date Value Ref Range Status   11/18/2021 301 150 - 400 K/uL Final        Assessment and Plan (including Health Maintenance)      Problem List  Smart Sets  Document Outside HM   :    Plan:         Health Maintenance Due   Topic Date Due    Eye Exam  Never done    Shingles Vaccine (2 of 3) 10/01/2018    Diabetes Urine Screening  02/18/2022       Problem List Items Addressed This Visit    None     Visit Diagnoses     Essential hypertension, malignant    -  Primary    review and cont home meds  bp appears well controlled     Relevant Medications    lisinopriL 10 MG tablet    potassium chloride SA (K-DUR,KLOR-CON M) 10 MEQ tablet    Other Relevant Orders    Comprehensive Metabolic Panel    Microalbumin/Creatinine Ratio, Urine    Hyperlipidemia, unspecified hyperlipidemia type        lab today  cont statin therapy    Relevant Medications    simvastatin (ZOCOR) 5 MG tablet    Other Relevant Orders    Lipid Panel    Diabetes mellitus without complication        Relevant Orders    Hemoglobin A1C          Health Maintenance Topics with due status: Not Due       Topic Last Completion Date    TETANUS VACCINE 04/19/2017    Influenza Vaccine 09/21/2021    Lipid Panel 05/05/2022    Hemoglobin A1c 05/05/2022       Future Appointments   Date Time Provider Department Center   9/22/2022 10:00 AM AWFABRICIO NURSESERGIO [facility] MED[facility] [place]   10/31/2022  9:00 AM MILLIE King [facility] [place]            Signature:  MILLIE King [facility] [address] [place] [address]  [phone]    Date of encounter: 8/4/22

## 2022-08-15 RX ORDER — TRAMADOL HYDROCHLORIDE 50 MG/1
50 TABLET ORAL EVERY 12 HOURS PRN
Qty: 60 TABLET | Refills: 2 | Status: SHIPPED | OUTPATIENT
Start: 2022-08-15 | End: 2022-09-22 | Stop reason: SDUPTHER

## 2022-09-22 ENCOUNTER — OFFICE VISIT (OUTPATIENT)
Dept: FAMILY MEDICINE | Facility: CLINIC | Age: 87
End: 2022-09-22
Payer: MEDICARE

## 2022-09-22 VITALS
RESPIRATION RATE: 20 BRPM | SYSTOLIC BLOOD PRESSURE: 102 MMHG | DIASTOLIC BLOOD PRESSURE: 60 MMHG | HEART RATE: 85 BPM | OXYGEN SATURATION: 99 % | HEIGHT: 60 IN | BODY MASS INDEX: 17.87 KG/M2 | WEIGHT: 91 LBS

## 2022-09-22 DIAGNOSIS — E78.5 HYPERLIPIDEMIA, UNSPECIFIED HYPERLIPIDEMIA TYPE: ICD-10-CM

## 2022-09-22 DIAGNOSIS — R63.6 UNDERWEIGHT: ICD-10-CM

## 2022-09-22 DIAGNOSIS — I10 ESSENTIAL HYPERTENSION, MALIGNANT: ICD-10-CM

## 2022-09-22 DIAGNOSIS — Z00.00 ENCOUNTER FOR SUBSEQUENT ANNUAL WELLNESS VISIT (AWV) IN MEDICARE PATIENT: Primary | ICD-10-CM

## 2022-09-22 DIAGNOSIS — E46 PROTEIN-CALORIE MALNUTRITION, UNSPECIFIED SEVERITY: ICD-10-CM

## 2022-09-22 DIAGNOSIS — E11.9 TYPE 2 DIABETES MELLITUS WITHOUT COMPLICATION, WITHOUT LONG-TERM CURRENT USE OF INSULIN: ICD-10-CM

## 2022-09-22 DIAGNOSIS — Z74.09 OTHER REDUCED MOBILITY: ICD-10-CM

## 2022-09-22 PROCEDURE — 1159F MED LIST DOCD IN RCRD: CPT | Mod: ,,, | Performed by: NURSE PRACTITIONER

## 2022-09-22 PROCEDURE — G0439 PR MEDICARE ANNUAL WELLNESS SUBSEQUENT VISIT: ICD-10-PCS | Mod: ,,, | Performed by: NURSE PRACTITIONER

## 2022-09-22 PROCEDURE — 1159F PR MEDICATION LIST DOCUMENTED IN MEDICAL RECORD: ICD-10-PCS | Mod: ,,, | Performed by: NURSE PRACTITIONER

## 2022-09-22 PROCEDURE — 1160F RVW MEDS BY RX/DR IN RCRD: CPT | Mod: ,,, | Performed by: NURSE PRACTITIONER

## 2022-09-22 PROCEDURE — 1160F PR REVIEW ALL MEDS BY PRESCRIBER/CLIN PHARMACIST DOCUMENTED: ICD-10-PCS | Mod: ,,, | Performed by: NURSE PRACTITIONER

## 2022-09-22 PROCEDURE — G0439 PPPS, SUBSEQ VISIT: HCPCS | Mod: ,,, | Performed by: NURSE PRACTITIONER

## 2022-09-22 RX ORDER — TRAMADOL HYDROCHLORIDE 50 MG/1
50 TABLET ORAL EVERY 12 HOURS PRN
Qty: 60 TABLET | Refills: 2 | Status: SHIPPED | OUTPATIENT
Start: 2022-09-22 | End: 2022-10-31 | Stop reason: SDUPTHER

## 2022-09-22 NOTE — PATIENT INSTRUCTIONS
Counseling and Referral of Other Preventative  (Italic type indicates deductible and co-insurance are waived)    Patient Name: Gisel Shelby  Today's Date: 9/22/2022    Health Maintenance       Date Due Completion Date    Eye Exam Never done ---    Influenza Vaccine (1) 09/01/2022 9/21/2021    Hemoglobin A1c 02/04/2023 8/4/2022    Diabetes Urine Screening 08/04/2023 8/4/2022    Lipid Panel 08/04/2023 8/4/2022    TETANUS VACCINE 04/19/2027 4/19/2017        No orders of the defined types were placed in this encounter.    The following information is provided to all patients.  This information is to help you find resources for any of the problems found today that may be affecting your health:                Living healthy guide: www.[facility].[place].gov      Understanding Diabetes: www.diabetes.org      Eating healthy: www.cdc.gov/healthyweight      CDC home safety checklist: www.cdc.gov/steadi/patient.html      Agency on Aging: www.goea.[place].[facility]      Alcoholics anonymous (AA): www.aa.org      Physical Activity: www.neptali.nih.gov/vn6prga      Tobacco use: www.quitwithusla.org

## 2022-10-18 ENCOUNTER — CLINICAL SUPPORT (OUTPATIENT)
Dept: FAMILY MEDICINE | Facility: CLINIC | Age: 87
End: 2022-10-18
Payer: MEDICARE

## 2022-10-18 DIAGNOSIS — Z23 INFLUENZA VACCINE ADMINISTERED: Primary | ICD-10-CM

## 2022-10-18 PROCEDURE — G0008 ADMIN INFLUENZA VIRUS VAC: HCPCS | Mod: ,,, | Performed by: NURSE PRACTITIONER

## 2022-10-18 PROCEDURE — 90694 FLU VACCINE - QUADRIVALENT - ADJUVANTED: ICD-10-PCS | Mod: ,,, | Performed by: NURSE PRACTITIONER

## 2022-10-18 PROCEDURE — G0008 FLU VACCINE - QUADRIVALENT - ADJUVANTED: ICD-10-PCS | Mod: ,,, | Performed by: NURSE PRACTITIONER

## 2022-10-18 PROCEDURE — 90694 VACC AIIV4 NO PRSRV 0.5ML IM: CPT | Mod: ,,, | Performed by: NURSE PRACTITIONER

## 2022-10-31 ENCOUNTER — OFFICE VISIT (OUTPATIENT)
Dept: FAMILY MEDICINE | Facility: CLINIC | Age: 87
End: 2022-10-31
Payer: MEDICARE

## 2022-10-31 VITALS
WEIGHT: 97 LBS | BODY MASS INDEX: 19.04 KG/M2 | HEART RATE: 86 BPM | SYSTOLIC BLOOD PRESSURE: 129 MMHG | HEIGHT: 60 IN | DIASTOLIC BLOOD PRESSURE: 69 MMHG | RESPIRATION RATE: 16 BRPM

## 2022-10-31 DIAGNOSIS — E78.5 HYPERLIPIDEMIA, UNSPECIFIED HYPERLIPIDEMIA TYPE: ICD-10-CM

## 2022-10-31 DIAGNOSIS — I10 ESSENTIAL HYPERTENSION, MALIGNANT: Primary | ICD-10-CM

## 2022-10-31 DIAGNOSIS — I10 ESSENTIAL HYPERTENSION, MALIGNANT: ICD-10-CM

## 2022-10-31 DIAGNOSIS — J30.2 SEASONAL ALLERGIES: ICD-10-CM

## 2022-10-31 DIAGNOSIS — E11.9 TYPE 2 DIABETES MELLITUS WITHOUT COMPLICATION, WITHOUT LONG-TERM CURRENT USE OF INSULIN: ICD-10-CM

## 2022-10-31 LAB
ALBUMIN SERPL BCP-MCNC: 3.2 G/DL (ref 3.5–5)
ALBUMIN/GLOB SERPL: 1 {RATIO}
ALP SERPL-CCNC: 72 U/L (ref 55–142)
ALT SERPL W P-5'-P-CCNC: 13 U/L (ref 13–56)
ANION GAP SERPL CALCULATED.3IONS-SCNC: 10 MMOL/L (ref 7–16)
AST SERPL W P-5'-P-CCNC: 12 U/L (ref 15–37)
BILIRUB SERPL-MCNC: 0.8 MG/DL (ref ?–1.2)
BUN SERPL-MCNC: 17 MG/DL (ref 7–18)
BUN/CREAT SERPL: 19 (ref 6–20)
CALCIUM SERPL-MCNC: 8.9 MG/DL (ref 8.5–10.1)
CHLORIDE SERPL-SCNC: 106 MMOL/L (ref 98–107)
CHOLEST SERPL-MCNC: 146 MG/DL (ref 0–200)
CHOLEST/HDLC SERPL: 1.8 {RATIO}
CO2 SERPL-SCNC: 27 MMOL/L (ref 21–32)
CREAT SERPL-MCNC: 0.88 MG/DL (ref 0.55–1.02)
EGFR (NO RACE VARIABLE) (RUSH/TITUS): 60 ML/MIN/1.73M²
EST. AVERAGE GLUCOSE BLD GHB EST-MCNC: 94 MG/DL
GLOBULIN SER-MCNC: 3.3 G/DL (ref 2–4)
GLUCOSE SERPL-MCNC: 74 MG/DL (ref 74–106)
HBA1C MFR BLD HPLC: 5.4 % (ref 4.5–6.6)
HDLC SERPL-MCNC: 83 MG/DL (ref 40–60)
LDLC SERPL CALC-MCNC: 51 MG/DL
NONHDLC SERPL-MCNC: 63 MG/DL
POTASSIUM SERPL-SCNC: 4.1 MMOL/L (ref 3.5–5.1)
PROT SERPL-MCNC: 6.5 G/DL (ref 6.4–8.2)
SODIUM SERPL-SCNC: 139 MMOL/L (ref 136–145)
TRIGL SERPL-MCNC: 60 MG/DL (ref 35–150)
VLDLC SERPL-MCNC: 12 MG/DL

## 2022-10-31 PROCEDURE — 99214 PR OFFICE/OUTPT VISIT, EST, LEVL IV, 30-39 MIN: ICD-10-PCS | Mod: ,,, | Performed by: NURSE PRACTITIONER

## 2022-10-31 PROCEDURE — 80061 LIPID PANEL: ICD-10-PCS | Mod: ,,, | Performed by: CLINICAL MEDICAL LABORATORY

## 2022-10-31 PROCEDURE — 80053 COMPREHEN METABOLIC PANEL: CPT | Mod: ,,, | Performed by: CLINICAL MEDICAL LABORATORY

## 2022-10-31 PROCEDURE — 80053 COMPREHENSIVE METABOLIC PANEL: ICD-10-PCS | Mod: ,,, | Performed by: CLINICAL MEDICAL LABORATORY

## 2022-10-31 PROCEDURE — 83036 HEMOGLOBIN GLYCOSYLATED A1C: CPT | Mod: ,,, | Performed by: CLINICAL MEDICAL LABORATORY

## 2022-10-31 PROCEDURE — 99214 OFFICE O/P EST MOD 30 MIN: CPT | Mod: ,,, | Performed by: NURSE PRACTITIONER

## 2022-10-31 PROCEDURE — 1160F PR REVIEW ALL MEDS BY PRESCRIBER/CLIN PHARMACIST DOCUMENTED: ICD-10-PCS | Mod: ,,, | Performed by: NURSE PRACTITIONER

## 2022-10-31 PROCEDURE — 83036 HEMOGLOBIN A1C: ICD-10-PCS | Mod: ,,, | Performed by: CLINICAL MEDICAL LABORATORY

## 2022-10-31 PROCEDURE — 1159F PR MEDICATION LIST DOCUMENTED IN MEDICAL RECORD: ICD-10-PCS | Mod: ,,, | Performed by: NURSE PRACTITIONER

## 2022-10-31 PROCEDURE — 80061 LIPID PANEL: CPT | Mod: ,,, | Performed by: CLINICAL MEDICAL LABORATORY

## 2022-10-31 PROCEDURE — 1159F MED LIST DOCD IN RCRD: CPT | Mod: ,,, | Performed by: NURSE PRACTITIONER

## 2022-10-31 PROCEDURE — 1160F RVW MEDS BY RX/DR IN RCRD: CPT | Mod: ,,, | Performed by: NURSE PRACTITIONER

## 2022-10-31 RX ORDER — CARVEDILOL 3.12 MG/1
3.12 TABLET ORAL 2 TIMES DAILY WITH MEALS
COMMUNITY
End: 2022-10-31 | Stop reason: SDUPTHER

## 2022-10-31 RX ORDER — MEGESTROL ACETATE 40 MG/1
40 TABLET ORAL DAILY
Qty: 90 TABLET | Refills: 1 | Status: SHIPPED | OUTPATIENT
Start: 2022-10-31 | End: 2023-05-09 | Stop reason: SDUPTHER

## 2022-10-31 RX ORDER — TRAMADOL HYDROCHLORIDE 50 MG/1
50 TABLET ORAL EVERY 12 HOURS PRN
Qty: 60 TABLET | Refills: 2 | Status: CANCELLED | OUTPATIENT
Start: 2022-10-31

## 2022-10-31 RX ORDER — HYDROCHLOROTHIAZIDE 25 MG/1
25 TABLET ORAL DAILY
Qty: 90 TABLET | Refills: 1 | Status: CANCELLED | OUTPATIENT
Start: 2022-10-31

## 2022-10-31 RX ORDER — TRAMADOL HYDROCHLORIDE 50 MG/1
50 TABLET ORAL EVERY 12 HOURS PRN
Qty: 60 TABLET | Refills: 2 | Status: SHIPPED | OUTPATIENT
Start: 2022-10-31 | End: 2022-12-19 | Stop reason: SDUPTHER

## 2022-10-31 RX ORDER — CETIRIZINE HYDROCHLORIDE 10 MG/1
10 TABLET ORAL DAILY
Qty: 90 TABLET | Refills: 1 | Status: SHIPPED | OUTPATIENT
Start: 2022-10-31 | End: 2023-03-27

## 2022-10-31 RX ORDER — CARVEDILOL 3.12 MG/1
3.12 TABLET ORAL 2 TIMES DAILY WITH MEALS
Qty: 180 TABLET | Refills: 1 | Status: SHIPPED | OUTPATIENT
Start: 2022-10-31 | End: 2023-03-27

## 2022-10-31 NOTE — PROGRESS NOTES
MILLIE King   R[facility] TATYANA MCLAUGHLIN [facility] [address] [place] [address]  [phone]      PATIENT NAME: Gisel Shelby  : 1925  DATE: 10/31/22  MRN: [id number]      Billing Provider: MILLIE King  Level of Service:   Patient PCP Information       Provider PCP Type    MILLIE Kign General            Reason for Visit / Chief Complaint: Follow-up, Hypertension, and Diabetes       Update PCP  Update Chief Complaint         History of Present Illness / Problem Focused Workflow     Gisel Shelby presents to the clinic with Follow-up, Hypertension, and Diabetes     Pt presents for routine follow up with lab and med refills.     Overall doing well. Bp appears well controlled. Cont home meds.       Review of Systems     Review of Systems   Constitutional:  Negative for fatigue and fever.   HENT:  Negative for nasal congestion and sore throat.    Eyes:  Negative for visual disturbance.   Respiratory:  Negative for chest tightness and shortness of breath.    Cardiovascular:  Negative for chest pain and leg swelling.   Gastrointestinal:  Negative for abdominal pain, change in bowel habit and change in bowel habit.   Endocrine: Negative for polydipsia, polyphagia and polyuria.   Genitourinary:  Negative for dysuria and hematuria.   Musculoskeletal:  Negative for back pain and leg pain.   Integumentary:  Negative for rash.   Neurological:  Negative for dizziness, syncope, weakness and light-headedness.      Medical / Social / Family History     Past Medical History:   Diagnosis Date    Hypertension        Past Surgical History:   Procedure Laterality Date    APPENDECTOMY         Social History  Ms. Shelby  reports that she has never smoked. She has never used smokeless tobacco. She reports that she does not drink alcohol and does not use drugs.    Family History  Ms. Shelby's family history includes  Diabetes in her brother and sister; Hypertension in her brother, mother, and sister.    Medications and Allergies     Medications  Outpatient Medications Marked as Taking for the 10/31/22 encounter (Office Visit) with MILLIE King   Medication Sig Dispense Refill    alcohol swabs (BD ALCOHOL SWABS) PadM Apply 3 each topically once daily. 400 each 3    aspirin (ECOTRIN) 81 MG EC tablet Take 81 mg by mouth once daily.      blood glucose control, low (TRUE METRIX LEVEL 1) Soln 1 application by NOT APPLICABLE route daily as needed (meter contol). 1 each 3    blood-glucose meter (TRUE METRIX GLUCOSE METER) kit Monitor glucose levels twice daily and prn 1 each 3    diclofenac sodium (VOLTAREN) 1 % Gel Apply 2 g topically once daily. 150 g 1    fluticasone propionate (FLONASE) 50 mcg/actuation nasal spray 1 spray (50 mcg total) by Each Nostril route once daily. 16 g 3    hydroCHLOROthiazide (HYDRODIURIL) 25 MG tablet Take 1 tablet (25 mg total) by mouth once daily. 90 tablet 1    lisinopriL 10 MG tablet Take 1 tablet (10 mg total) by mouth once daily. 90 tablet 1    potassium chloride SA (K-DUR,KLOR-CON M) 10 MEQ tablet Take 1 tablet (10 mEq total) by mouth once daily. 90 tablet 1    simvastatin (ZOCOR) 5 MG tablet Take 1 tablet (5 mg total) by mouth every evening. 90 tablet 1    traMADoL (ULTRAM) 50 mg tablet Take 1 tablet (50 mg total) by mouth every 12 (twelve) hours as needed for Pain. 60 tablet 2    TRUE METRIX GLUCOSE TEST STRIP Strp USE AS DIRECTED TWICE DAILY 200 strip 2    TRUEPLUS LANCETS 33 gauge Misc TEST BLOOD SUGAR TWICE DAILY 200 each 2    [DISCONTINUED] carvediloL (COREG) 3.125 MG tablet Take 3.125 mg by mouth 2 (two) times daily with meals.      [DISCONTINUED] cetirizine (ZYRTEC) 10 MG tablet Take 1 tablet (10 mg total) by mouth once daily. 90 tablet 1    [DISCONTINUED] megestroL (MEGACE) 40 MG Tab TAKE 1 TABLET EVERY DAY 90 tablet 1       Allergies  Review of patient's allergies indicates:  No  Known Allergies    Physical Examination     Vitals:    10/31/22 0930   BP: 129/69   Pulse: 86   Resp: 16     Physical Exam  Eyes:      Pupils: Pupils are equal, round, and reactive to light.   Cardiovascular:      Rate and Rhythm: Normal rate and regular rhythm.      Heart sounds: Normal heart sounds. No murmur heard.  Pulmonary:      Breath sounds: Normal breath sounds. No wheezing, rhonchi or rales.   Abdominal:      General: Bowel sounds are normal.   Musculoskeletal:         General: No swelling.      Cervical back: Normal range of motion and neck supple.   Skin:     General: Skin is warm and dry.   Neurological:      Mental Status: She is alert and oriented to person, place, and time.        Assessment and Plan (including Health Maintenance)      Problem List  Smart [employer]  Document Outside HM   :    Plan:   Cont home meds      Health Maintenance Due   Topic Date Due    Eye Exam  Never done    COVID-19 Vaccine (5 - Booster) 07/06/2022       Problem List Items Addressed This Visit    None  Visit Diagnoses       Essential hypertension, malignant    -  Primary    review and cont home meds  bp appears well controlled     Relevant Orders    Comprehensive Metabolic Panel    Type 2 diabetes mellitus without complication, without long-term current use of insulin        diet controlled  lab today     Relevant Orders    Hemoglobin A1C    Essential hypertension, malignant        Relevant Orders    Comprehensive Metabolic Panel    Hyperlipidemia, unspecified hyperlipidemia type        Relevant Orders    Lipid Panel    Seasonal allergies        Relevant Medications    cetirizine (ZYRTEC) 10 MG tablet            Health Maintenance Topics with due status: Not Due       Topic Last Completion Date    TETANUS VACCINE 04/19/2017    Diabetes Urine Screening 08/04/2022    Lipid Panel 08/04/2022    Hemoglobin A1c 08/04/2022       Future Appointments   Date Time Provider Department Center   1/12/2023  9:40 AM MILLIE King  [facility] [place]   9/26/2023 10:00 AM AWV NURSE, [facility] [place]            Signature:  MILLIE King [facility] [address] [place] [address]  [phone]    Date of encounter: 10/31/22

## 2022-11-09 DIAGNOSIS — Z71.89 COMPLEX CARE COORDINATION: ICD-10-CM

## 2022-11-09 RX ORDER — TRAMADOL HYDROCHLORIDE 50 MG/1
50 TABLET ORAL EVERY 12 HOURS PRN
Qty: 60 TABLET | Refills: 2 | OUTPATIENT
Start: 2022-11-09

## 2022-11-09 NOTE — TELEPHONE ENCOUNTER
----- Message from Ella Winn sent at 11/9/2022  9:53 AM CST -----  Regarding: REFILL  PATIENT CALL TO GET A REFILL ON (TRAMADOL) SENT TO [facility], CALL THE PATIENT  [phone]

## 2022-12-19 RX ORDER — TRAMADOL HYDROCHLORIDE 50 MG/1
50 TABLET ORAL EVERY 12 HOURS PRN
Qty: 60 TABLET | Refills: 2 | Status: SHIPPED | OUTPATIENT
Start: 2022-12-19 | End: 2023-03-30 | Stop reason: SDUPTHER

## 2023-01-12 ENCOUNTER — OFFICE VISIT (OUTPATIENT)
Dept: FAMILY MEDICINE | Facility: CLINIC | Age: 88
End: 2023-01-12
Payer: MEDICARE

## 2023-01-12 VITALS
RESPIRATION RATE: 18 BRPM | TEMPERATURE: 98 F | WEIGHT: 93 LBS | OXYGEN SATURATION: 99 % | HEIGHT: 60 IN | DIASTOLIC BLOOD PRESSURE: 69 MMHG | SYSTOLIC BLOOD PRESSURE: 133 MMHG | BODY MASS INDEX: 18.26 KG/M2 | HEART RATE: 86 BPM

## 2023-01-12 DIAGNOSIS — I10 ESSENTIAL HYPERTENSION, MALIGNANT: Primary | ICD-10-CM

## 2023-01-12 DIAGNOSIS — E11.9 TYPE 2 DIABETES MELLITUS WITHOUT COMPLICATION, WITHOUT LONG-TERM CURRENT USE OF INSULIN: ICD-10-CM

## 2023-01-12 DIAGNOSIS — E46 PROTEIN-CALORIE MALNUTRITION, UNSPECIFIED SEVERITY: ICD-10-CM

## 2023-01-12 DIAGNOSIS — E78.5 HYPERLIPIDEMIA, UNSPECIFIED HYPERLIPIDEMIA TYPE: ICD-10-CM

## 2023-01-12 LAB
ALBUMIN SERPL BCP-MCNC: 3.2 G/DL (ref 3.5–5)
ALBUMIN/GLOB SERPL: 1 {RATIO}
ALP SERPL-CCNC: 58 U/L (ref 55–142)
ALT SERPL W P-5'-P-CCNC: 17 U/L (ref 13–56)
ANION GAP SERPL CALCULATED.3IONS-SCNC: 11 MMOL/L (ref 7–16)
AST SERPL W P-5'-P-CCNC: 12 U/L (ref 15–37)
BILIRUB SERPL-MCNC: 0.7 MG/DL (ref ?–1.2)
BUN SERPL-MCNC: 24 MG/DL (ref 7–18)
BUN/CREAT SERPL: 31 (ref 6–20)
CALCIUM SERPL-MCNC: 9.2 MG/DL (ref 8.5–10.1)
CHLORIDE SERPL-SCNC: 103 MMOL/L (ref 98–107)
CHOLEST SERPL-MCNC: 135 MG/DL (ref 0–200)
CHOLEST/HDLC SERPL: 1.8 {RATIO}
CO2 SERPL-SCNC: 28 MMOL/L (ref 21–32)
CREAT SERPL-MCNC: 0.78 MG/DL (ref 0.55–1.02)
EGFR (NO RACE VARIABLE) (RUSH/TITUS): 69 ML/MIN/1.73M²
EST. AVERAGE GLUCOSE BLD GHB EST-MCNC: 94 MG/DL
GLOBULIN SER-MCNC: 3.3 G/DL (ref 2–4)
GLUCOSE SERPL-MCNC: 78 MG/DL (ref 74–106)
HBA1C MFR BLD HPLC: 5.4 % (ref 4.5–6.6)
HDLC SERPL-MCNC: 75 MG/DL (ref 40–60)
LDLC SERPL CALC-MCNC: 49 MG/DL
NONHDLC SERPL-MCNC: 60 MG/DL
POTASSIUM SERPL-SCNC: 4.3 MMOL/L (ref 3.5–5.1)
PROT SERPL-MCNC: 6.5 G/DL (ref 6.4–8.2)
SODIUM SERPL-SCNC: 138 MMOL/L (ref 136–145)
TRIGL SERPL-MCNC: 54 MG/DL (ref 35–150)
VLDLC SERPL-MCNC: 11 MG/DL

## 2023-01-12 PROCEDURE — 83036 HEMOGLOBIN A1C: ICD-10-PCS | Mod: ,,, | Performed by: CLINICAL MEDICAL LABORATORY

## 2023-01-12 PROCEDURE — 3288F FALL RISK ASSESSMENT DOCD: CPT | Mod: ,,, | Performed by: NURSE PRACTITIONER

## 2023-01-12 PROCEDURE — 80061 LIPID PANEL: ICD-10-PCS | Mod: ,,, | Performed by: CLINICAL MEDICAL LABORATORY

## 2023-01-12 PROCEDURE — 80053 COMPREHEN METABOLIC PANEL: CPT | Mod: ,,, | Performed by: CLINICAL MEDICAL LABORATORY

## 2023-01-12 PROCEDURE — 1101F PR PT FALLS ASSESS DOC 0-1 FALLS W/OUT INJ PAST YR: ICD-10-PCS | Mod: ,,, | Performed by: NURSE PRACTITIONER

## 2023-01-12 PROCEDURE — 1159F PR MEDICATION LIST DOCUMENTED IN MEDICAL RECORD: ICD-10-PCS | Mod: ,,, | Performed by: NURSE PRACTITIONER

## 2023-01-12 PROCEDURE — 3288F PR FALLS RISK ASSESSMENT DOCUMENTED: ICD-10-PCS | Mod: ,,, | Performed by: NURSE PRACTITIONER

## 2023-01-12 PROCEDURE — 80053 COMPREHENSIVE METABOLIC PANEL: ICD-10-PCS | Mod: ,,, | Performed by: CLINICAL MEDICAL LABORATORY

## 2023-01-12 PROCEDURE — 80061 LIPID PANEL: CPT | Mod: ,,, | Performed by: CLINICAL MEDICAL LABORATORY

## 2023-01-12 PROCEDURE — 99213 OFFICE O/P EST LOW 20 MIN: CPT | Mod: ,,, | Performed by: NURSE PRACTITIONER

## 2023-01-12 PROCEDURE — 99213 PR OFFICE/OUTPT VISIT, EST, LEVL III, 20-29 MIN: ICD-10-PCS | Mod: ,,, | Performed by: NURSE PRACTITIONER

## 2023-01-12 PROCEDURE — 83036 HEMOGLOBIN GLYCOSYLATED A1C: CPT | Mod: ,,, | Performed by: CLINICAL MEDICAL LABORATORY

## 2023-01-12 PROCEDURE — 1159F MED LIST DOCD IN RCRD: CPT | Mod: ,,, | Performed by: NURSE PRACTITIONER

## 2023-01-12 PROCEDURE — 1101F PT FALLS ASSESS-DOCD LE1/YR: CPT | Mod: ,,, | Performed by: NURSE PRACTITIONER

## 2023-01-12 NOTE — PROGRESS NOTES
MILLIE King   [facility] [address] [place] [address]  [phone]      PATIENT NAME: Giesl Shelby  : 1925  DATE: 23  MRN: [id number]      Billing Provider: MILLIE King  Level of Service:   Patient PCP Information       Provider PCP Type    MILLIE King General            Reason for Visit / Chief Complaint: Follow-up, Hypertension, Hyperlipidemia, Diabetes, Knee Pain, and Leg Pain       Update PCP  Update Chief Complaint         History of Present Illness / Problem Focused Workflow     Gisel Shelby presents to the clinic with Follow-up, Hypertension, Hyperlipidemia, Diabetes, Knee Pain, and Leg Pain     Pt presents for routine follow up with lab and med refills. Overall doing well. Pt has lost a few pounds since last appt. (We also have a new scale so will monitor closely) encouraged cont use of boost/ensure and diet as tolerated. Goal is to gain 2 pounds by next appt.       Review of Systems     Review of Systems   Constitutional:  Negative for fatigue and fever.   HENT:  Negative for nasal congestion and sore throat.    Eyes:  Negative for visual disturbance.   Respiratory:  Negative for chest tightness and shortness of breath.    Cardiovascular:  Negative for chest pain and leg swelling.   Gastrointestinal:  Negative for abdominal pain, change in bowel habit and change in bowel habit.   Endocrine: Negative for polydipsia, polyphagia and polyuria.   Genitourinary:  Negative for dysuria and hematuria.   Musculoskeletal:  Negative for back pain and leg pain.   Integumentary:  Negative for rash.   Neurological:  Negative for dizziness, syncope, weakness and light-headedness.      Medical / Social / Family History     Past Medical History:   Diagnosis Date    Hypertension        Past Surgical History:   Procedure Laterality Date    APPENDECTOMY         Social  History  Ms. Shelby  reports that she has never smoked. She has never used smokeless tobacco. She reports that she does not drink alcohol and does not use drugs.    Family History  Ms. Shelby's family history includes Diabetes in her brother and sister; Hypertension in her brother, mother, and sister.    Medications and Allergies     Medications  Outpatient Medications Marked as Taking for the 1/12/23 encounter (Office Visit) with MILLIE King   Medication Sig Dispense Refill    alcohol swabs (BD ALCOHOL SWABS) PadM Apply 3 each topically once daily. 400 each 3    aspirin (ECOTRIN) 81 MG EC tablet Take 81 mg by mouth once daily.      blood glucose control, low (TRUE METRIX LEVEL 1) Soln 1 application by NOT APPLICABLE route daily as needed (meter contol). 1 each 3    blood-glucose meter (TRUE METRIX GLUCOSE METER) kit Monitor glucose levels twice daily and prn 1 each 3    carvediloL (COREG) 3.125 MG tablet Take 1 tablet (3.125 mg total) by mouth 2 (two) times daily with meals. 180 tablet 1    cetirizine (ZYRTEC) 10 MG tablet Take 1 tablet (10 mg total) by mouth once daily. 90 tablet 1    diclofenac sodium (VOLTAREN) 1 % Gel Apply 2 g topically once daily. 150 g 1    fluticasone propionate (FLONASE) 50 mcg/actuation nasal spray 1 spray (50 mcg total) by Each Nostril route once daily. 16 g 3    hydroCHLOROthiazide (HYDRODIURIL) 25 MG tablet Take 1 tablet (25 mg total) by mouth once daily. 90 tablet 1    lisinopriL 10 MG tablet Take 1 tablet (10 mg total) by mouth once daily. 90 tablet 1    megestroL (MEGACE) 40 MG Tab Take 1 tablet (40 mg total) by mouth once daily. 90 tablet 1    potassium chloride SA (K-DUR,KLOR-CON M) 10 MEQ tablet Take 1 tablet (10 mEq total) by mouth once daily. 90 tablet 1    simvastatin (ZOCOR) 5 MG tablet Take 1 tablet (5 mg total) by mouth every evening. 90 tablet 1    traMADoL (ULTRAM) 50 mg tablet Take 1 tablet (50 mg total) by mouth every 12 (twelve) hours as needed for  Pain. 60 tablet 2    TRUE METRIX GLUCOSE TEST STRIP Strp USE AS DIRECTED TWICE DAILY 200 strip 2    TRUEPLUS LANCETS 33 gauge Misc TEST BLOOD SUGAR TWICE DAILY 200 each 2       Allergies  Review of patient's allergies indicates:  No Known Allergies    Physical Examination     Vitals:    01/12/23 0918   BP: 133/69   Pulse: 86   Resp: 18   Temp: 98.4 °F (36.9 °C)     Physical Exam  Constitutional:       General: She is not in acute distress.     Comments: Thin frail appearing elderly female    Eyes:      Pupils: Pupils are equal, round, and reactive to light.   Cardiovascular:      Rate and Rhythm: Normal rate and regular rhythm.      Heart sounds: No murmur heard.  Pulmonary:      Breath sounds: Normal breath sounds. No wheezing, rhonchi or rales.   Abdominal:      General: Bowel sounds are normal.   Musculoskeletal:         General: No swelling.      Cervical back: Normal range of motion and neck supple.   Neurological:      Mental Status: She is alert and oriented to person, place, and time.        Assessment and Plan (including Health Maintenance)      Problem List  Smart Sets  Document Outside HM   :    Plan:   Cont home meds  Cont boost/ensure       Health Maintenance Due   Topic Date Due    Eye Exam  Never done    Sign Pain Contract  Never done    COVID-19 Vaccine (5 - Booster) 07/06/2022       Problem List Items Addressed This Visit    None  Visit Diagnoses       Essential hypertension, malignant    -  Primary    Relevant Orders    Comprehensive Metabolic Panel    Hyperlipidemia, unspecified hyperlipidemia type        Relevant Orders    Lipid Panel    Type 2 diabetes mellitus without complication, without long-term current use of insulin        Relevant Orders    Hemoglobin A1C    Protein-calorie malnutrition, unspecified severity        cont boost/ensure             Health Maintenance Topics with due status: Not Due       Topic Last Completion Date    TETANUS VACCINE 04/19/2017    Diabetes Urine Screening  08/04/2022    Lipid Panel 10/31/2022    Hemoglobin A1c 10/31/2022       Future Appointments   Date Time Provider Department Center   4/27/2023  9:00 AM MILLIE King [facility]   9/26/2023 10:00 AM AWFABRICIO NURSESERGIO [facility] [place]            Signature:  MILLIE King [facility] [address] [place] [address]  [phone]    Date of encounter: 1/12/23

## 2023-03-30 RX ORDER — TRAMADOL HYDROCHLORIDE 50 MG/1
50 TABLET ORAL EVERY 12 HOURS PRN
Qty: 60 TABLET | Refills: 2 | Status: SHIPPED | OUTPATIENT
Start: 2023-03-30 | End: 2023-07-10 | Stop reason: SDUPTHER

## 2023-03-30 NOTE — TELEPHONE ENCOUNTER
----- Message from Zekeiah Ormond sent at 3/30/2023  2:17 PM CDT -----  Patient asked for a refill on traMADoL (ULTRAM) 50 mg tablet

## 2023-04-10 ENCOUNTER — TELEPHONE (OUTPATIENT)
Dept: FAMILY MEDICINE | Facility: CLINIC | Age: 88
End: 2023-04-10
Payer: MEDICARE

## 2023-04-10 NOTE — TELEPHONE ENCOUNTER
----- Message from Leila Resendez sent at 4/10/2023  2:04 PM CDT -----  [facility] called wanting to get clarification on the patient's tramadol medication [phone] reference number [id number].

## 2023-05-09 ENCOUNTER — OFFICE VISIT (OUTPATIENT)
Dept: FAMILY MEDICINE | Facility: CLINIC | Age: 88
End: 2023-05-09
Payer: MEDICARE

## 2023-05-09 VITALS
OXYGEN SATURATION: 100 % | HEART RATE: 72 BPM | BODY MASS INDEX: 17.81 KG/M2 | HEIGHT: 60 IN | TEMPERATURE: 98 F | SYSTOLIC BLOOD PRESSURE: 138 MMHG | WEIGHT: 90.69 LBS | RESPIRATION RATE: 18 BRPM | DIASTOLIC BLOOD PRESSURE: 77 MMHG

## 2023-05-09 DIAGNOSIS — I10 ESSENTIAL HYPERTENSION, MALIGNANT: Primary | ICD-10-CM

## 2023-05-09 DIAGNOSIS — E11.9 TYPE 2 DIABETES MELLITUS WITHOUT COMPLICATION, WITHOUT LONG-TERM CURRENT USE OF INSULIN: ICD-10-CM

## 2023-05-09 DIAGNOSIS — E46 PROTEIN-CALORIE MALNUTRITION, UNSPECIFIED SEVERITY: ICD-10-CM

## 2023-05-09 DIAGNOSIS — E78.5 HYPERLIPIDEMIA, UNSPECIFIED HYPERLIPIDEMIA TYPE: ICD-10-CM

## 2023-05-09 LAB
ALBUMIN SERPL BCP-MCNC: 3.1 G/DL (ref 3.5–5)
ALBUMIN/GLOB SERPL: 0.7 {RATIO}
ALP SERPL-CCNC: 67 U/L (ref 55–142)
ALT SERPL W P-5'-P-CCNC: 18 U/L (ref 13–56)
ANION GAP SERPL CALCULATED.3IONS-SCNC: 6 MMOL/L (ref 7–16)
AST SERPL W P-5'-P-CCNC: 14 U/L (ref 15–37)
BILIRUB SERPL-MCNC: 0.5 MG/DL (ref ?–1.2)
BUN SERPL-MCNC: 19 MG/DL (ref 7–18)
BUN/CREAT SERPL: 24 (ref 6–20)
CALCIUM SERPL-MCNC: 9.5 MG/DL (ref 8.5–10.1)
CHLORIDE SERPL-SCNC: 109 MMOL/L (ref 98–107)
CHOLEST SERPL-MCNC: 129 MG/DL (ref 0–200)
CHOLEST/HDLC SERPL: 2 {RATIO}
CO2 SERPL-SCNC: 27 MMOL/L (ref 21–32)
CREAT SERPL-MCNC: 0.8 MG/DL (ref 0.55–1.02)
EGFR (NO RACE VARIABLE) (RUSH/TITUS): 67 ML/MIN/1.73M2
GLOBULIN SER-MCNC: 4.3 G/DL (ref 2–4)
GLUCOSE SERPL-MCNC: 75 MG/DL (ref 74–106)
HDLC SERPL-MCNC: 64 MG/DL (ref 40–60)
LDLC SERPL CALC-MCNC: 57 MG/DL
NONHDLC SERPL-MCNC: 65 MG/DL
POTASSIUM SERPL-SCNC: 3.9 MMOL/L (ref 3.5–5.1)
PROT SERPL-MCNC: 7.4 G/DL (ref 6.4–8.2)
SODIUM SERPL-SCNC: 138 MMOL/L (ref 136–145)
TRIGL SERPL-MCNC: 41 MG/DL (ref 35–150)
VLDLC SERPL-MCNC: 8 MG/DL

## 2023-05-09 PROCEDURE — 80061 LIPID PANEL: ICD-10-PCS | Mod: ,,, | Performed by: CLINICAL MEDICAL LABORATORY

## 2023-05-09 PROCEDURE — 1101F PR PT FALLS ASSESS DOC 0-1 FALLS W/OUT INJ PAST YR: ICD-10-PCS | Mod: ,,, | Performed by: NURSE PRACTITIONER

## 2023-05-09 PROCEDURE — 1159F PR MEDICATION LIST DOCUMENTED IN MEDICAL RECORD: ICD-10-PCS | Mod: ,,, | Performed by: NURSE PRACTITIONER

## 2023-05-09 PROCEDURE — 99213 PR OFFICE/OUTPT VISIT, EST, LEVL III, 20-29 MIN: ICD-10-PCS | Mod: ,,, | Performed by: NURSE PRACTITIONER

## 2023-05-09 PROCEDURE — 99213 OFFICE O/P EST LOW 20 MIN: CPT | Mod: ,,, | Performed by: NURSE PRACTITIONER

## 2023-05-09 PROCEDURE — 1160F PR REVIEW ALL MEDS BY PRESCRIBER/CLIN PHARMACIST DOCUMENTED: ICD-10-PCS | Mod: ,,, | Performed by: NURSE PRACTITIONER

## 2023-05-09 PROCEDURE — 80061 LIPID PANEL: CPT | Mod: ,,, | Performed by: CLINICAL MEDICAL LABORATORY

## 2023-05-09 PROCEDURE — 1159F MED LIST DOCD IN RCRD: CPT | Mod: ,,, | Performed by: NURSE PRACTITIONER

## 2023-05-09 PROCEDURE — 3288F FALL RISK ASSESSMENT DOCD: CPT | Mod: ,,, | Performed by: NURSE PRACTITIONER

## 2023-05-09 PROCEDURE — 80053 COMPREHEN METABOLIC PANEL: CPT | Mod: ,,, | Performed by: CLINICAL MEDICAL LABORATORY

## 2023-05-09 PROCEDURE — 80053 COMPREHENSIVE METABOLIC PANEL: ICD-10-PCS | Mod: ,,, | Performed by: CLINICAL MEDICAL LABORATORY

## 2023-05-09 PROCEDURE — 1101F PT FALLS ASSESS-DOCD LE1/YR: CPT | Mod: ,,, | Performed by: NURSE PRACTITIONER

## 2023-05-09 PROCEDURE — 1160F RVW MEDS BY RX/DR IN RCRD: CPT | Mod: ,,, | Performed by: NURSE PRACTITIONER

## 2023-05-09 PROCEDURE — 3288F PR FALLS RISK ASSESSMENT DOCUMENTED: ICD-10-PCS | Mod: ,,, | Performed by: NURSE PRACTITIONER

## 2023-05-09 RX ORDER — HYDROCHLOROTHIAZIDE 25 MG/1
25 TABLET ORAL DAILY
Qty: 90 TABLET | Refills: 1 | Status: SHIPPED | OUTPATIENT
Start: 2023-05-09 | End: 2023-08-30 | Stop reason: SDUPTHER

## 2023-05-09 RX ORDER — NYSTATIN 100000 [USP'U]/ML
5 SUSPENSION ORAL 4 TIMES DAILY
Qty: 200 ML | Refills: 0 | Status: SHIPPED | OUTPATIENT
Start: 2023-05-09 | End: 2023-05-09

## 2023-05-09 RX ORDER — CALCIUM CITRATE/VITAMIN D3 200MG-6.25
TABLET ORAL
Qty: 200 STRIP | Refills: 2 | Status: SHIPPED | OUTPATIENT
Start: 2023-05-09 | End: 2024-03-07

## 2023-05-09 RX ORDER — LANCETS 33 GAUGE
EACH MISCELLANEOUS
Qty: 200 EACH | Refills: 2 | Status: SHIPPED | OUTPATIENT
Start: 2023-05-09 | End: 2024-03-07

## 2023-05-09 RX ORDER — NYSTATIN 100000 [USP'U]/ML
5 SUSPENSION ORAL 4 TIMES DAILY
Qty: 200 ML | Refills: 0 | Status: SHIPPED | OUTPATIENT
Start: 2023-05-09 | End: 2023-05-19

## 2023-05-09 RX ORDER — MEGESTROL ACETATE 40 MG/1
40 TABLET ORAL DAILY
Qty: 90 TABLET | Refills: 1 | Status: SHIPPED | OUTPATIENT
Start: 2023-05-09 | End: 2023-08-30 | Stop reason: SDUPTHER

## 2023-05-09 NOTE — PROGRESS NOTES
MILLIE King   [facility] [address] [place] [address]  [phone]      PATIENT NAME: Gisel Shelby  : 1925  DATE: 23  MRN: [id number]      Billing Provider: MILLIE King  Level of Service:   Patient PCP Information       Provider PCP Type    MILLIE King General            Reason for Visit / Chief Complaint: Follow-up, Hypertension, and Hyperlipidemia       Update PCP  Update Chief Complaint         History of Present Illness / Problem Focused Workflow     Gisel Shelby presents to the clinic with Follow-up, Hypertension, and Hyperlipidemia     Pt presents for routine follow up with lab and med refills. Overall doing well.      BP appears  controlled today. Home meds reviewed  The current medical regimen is effective;  continue present plan and medications. Recommended patient to check home readings to monitor and see me for followup as scheduled or sooner as needed.   Discussed sodium restriction, maintaining ideal body weight and regular exercise program as physiologic means to continue to achieve blood pressure control in addition to medication compliance.  Patient was educated that both decongestant and anti-inflammatory medication may raise blood pressure.    Pt has lost 3 pounds since last appt. Will cont megace encourage cont use of boost supplemental shakes. Encouraged diet as tolerated. She has complaints of soreness in her mouth makes it difficult to eat. Will start on nystatin swish and swallow.     Health Maintenance:  Due to pt age she is up to date       Review of Systems     Review of Systems   Constitutional:  Positive for appetite change and unexpected weight change. Negative for fatigue and fever.   HENT:  Negative for nasal congestion and sore throat.    Eyes:  Negative for visual disturbance.   Respiratory:  Negative for chest tightness and shortness  of breath.    Cardiovascular:  Negative for chest pain and leg swelling.   Gastrointestinal:  Negative for abdominal pain, change in bowel habit and change in bowel habit.   Endocrine: Negative for polydipsia, polyphagia and polyuria.   Genitourinary:  Negative for dysuria and hematuria.   Musculoskeletal:  Negative for back pain and leg pain.   Integumentary:  Negative for rash.   Neurological:  Negative for dizziness, syncope, weakness and light-headedness.      Medical / Social / Family History     Past Medical History:   Diagnosis Date    Hypertension        Past Surgical History:   Procedure Laterality Date    APPENDECTOMY         Social History  Ms. Shelby  reports that she has never smoked. She has never used smokeless tobacco. She reports that she does not drink alcohol and does not use drugs.    Family History  Ms. Shelby's family history includes Diabetes in her brother and sister; Hypertension in her brother, mother, and sister.    Medications and Allergies     Medications  Outpatient Medications Marked as Taking for the 5/9/23 encounter (Office Visit) with MILLIE King   Medication Sig Dispense Refill    alcohol swabs (DROPSAFE ALCOHOL PREP PADS) PadM USE 3 SWABS TOPICALLY AS DIRECTED ONCE DAILY 300 each 3    aspirin (ECOTRIN) 81 MG EC tablet Take 81 mg by mouth once daily.      blood glucose control, low (TRUE METRIX LEVEL 1) Soln 1 application by NOT APPLICABLE route daily as needed (meter contol). 1 each 3    blood-glucose meter (TRUE METRIX AIR GLUCOSE METER) Misc Inject 1 each into the skin Daily. 1 each 1    blood-glucose meter (TRUE METRIX GLUCOSE METER) kit Monitor glucose levels twice daily and prn 1 each 3    carvediloL (COREG) 3.125 MG tablet TAKE 1 TABLET TWICE DAILY WITH MEALS 180 tablet 1    cetirizine (ZYRTEC) 10 MG tablet TAKE 1 TABLET ONE TIME DAILY 90 tablet 1    diclofenac sodium (VOLTAREN) 1 % Gel Apply 2 g topically once daily. 150 g 1    fluticasone propionate  (FLONASE) 50 mcg/actuation nasal spray 1 spray (50 mcg total) by Each Nostril route once daily. 16 g 3    lisinopriL 10 MG tablet TAKE 1 TABLET ONE TIME DAILY 90 tablet 1    potassium chloride SA (K-DUR,KLOR-CON M) 10 MEQ tablet Take 1 tablet (10 mEq total) by mouth once daily. 90 tablet 1    simvastatin (ZOCOR) 5 MG tablet TAKE 1 TABLET EVERY EVENING 90 tablet 1    traMADoL (ULTRAM) 50 mg tablet Take 1 tablet (50 mg total) by mouth every 12 (twelve) hours as needed for Pain. 60 tablet 2    TRUE METRIX GLUCOSE TEST STRIP Strp USE AS DIRECTED TWICE DAILY 200 strip 2    TRUEPLUS LANCETS 33 gauge Misc TEST BLOOD SUGAR TWICE DAILY 200 each 2    [DISCONTINUED] hydroCHLOROthiazide (HYDRODIURIL) 25 MG tablet Take 1 tablet (25 mg total) by mouth once daily. 90 tablet 1    [DISCONTINUED] megestroL (MEGACE) 40 MG Tab Take 1 tablet (40 mg total) by mouth once daily. 90 tablet 1       Allergies  Review of patient's allergies indicates:  No Known Allergies    Physical Examination     Vitals:    05/09/23 1024   BP: 138/77   Pulse: 72   Resp: 18   Temp: 97.6 °F (36.4 °C)     Physical Exam  Eyes:      Pupils: Pupils are equal, round, and reactive to light.   Cardiovascular:      Rate and Rhythm: Normal rate and regular rhythm.      Heart sounds: Normal heart sounds. No murmur heard.  Pulmonary:      Breath sounds: Normal breath sounds. No wheezing, rhonchi or rales.   Abdominal:      General: Bowel sounds are normal.   Musculoskeletal:         General: No swelling.      Cervical back: Normal range of motion and neck supple.   Skin:     General: Skin is warm and dry.   Neurological:      Mental Status: She is alert and oriented to person, place, and time.        Assessment and Plan (including Health Maintenance)      Problem List  Smart Sets  Document Outside HM   :    Plan:   1. Essential hypertension, malignant  Comments:  review and cont home meds  bp appears well controlled   Overview:  review and cont home meds  bp appears well  controlled     Orders:  -     Comprehensive Metabolic Panel; Future; Expected date: 05/09/2023  -     hydroCHLOROthiazide (HYDRODIURIL) 25 MG tablet; Take 1 tablet (25 mg total) by mouth once daily.  Dispense: 90 tablet; Refill: 1    2. Hyperlipidemia, unspecified hyperlipidemia type  -     Lipid Panel; Future; Expected date: 05/09/2023    3. Protein-calorie malnutrition, unspecified severity  Comments:  cont wt loss  drinking ensure daily  on appetite stimulant     4. BMI less than 19,adult    5. Type 2 diabetes mellitus without complication, without long-term current use of insulin  Comments:  diet controlled   on no meds     Other orders  -     megestroL (MEGACE) 40 MG Tab; Take 1 tablet (40 mg total) by mouth once daily.  Dispense: 90 tablet; Refill: 1  -     Discontinue: nystatin (MYCOSTATIN) 100,000 unit/mL suspension; Take 5 mLs (500,000 Units total) by mouth 4 (four) times daily. for 10 days  Dispense: 200 mL; Refill: 0  -     nystatin (MYCOSTATIN) 100,000 unit/mL suspension; Take 5 mLs (500,000 Units total) by mouth 4 (four) times daily. for 10 days  Dispense: 200 mL; Refill: 0           Health Maintenance Due   Topic Date Due    Eye Exam  Never done       Problem List Items Addressed This Visit          Cardiac/Vascular    Essential hypertension, malignant - Primary    Overview     review and cont home meds  bp appears well controlled            Relevant Medications    hydroCHLOROthiazide (HYDRODIURIL) 25 MG tablet    Other Relevant Orders    Comprehensive Metabolic Panel    Hyperlipidemia    Relevant Orders    Lipid Panel     Other Visit Diagnoses       Protein-calorie malnutrition, unspecified severity        cont wt loss  drinking ensure daily  on appetite stimulant     BMI less than 19,adult        Type 2 diabetes mellitus without complication, without long-term current use of insulin        diet controlled   on no meds             Health Maintenance Topics with due status: Not Due       Topic Last  Completion Date    TETANUS VACCINE 04/19/2017    Diabetes Urine Screening 08/04/2022    Lipid Panel 01/12/2023    Hemoglobin A1c 01/12/2023       Future Appointments   Date Time Provider Department Center   8/30/2023  9:00 AM MILLIE King [facility] [place]   9/26/2023 10:00 AM AWV NURSE, [facility] [place]            Signature:  [facility] [address] [place] [address]  [phone]    Date of encounter: 5/9/23

## 2023-05-22 RX ORDER — NYSTATIN 100000 [USP'U]/ML
SUSPENSION ORAL
Qty: 200 ML | Refills: 0 | Status: SHIPPED | OUTPATIENT
Start: 2023-05-22 | End: 2023-10-23

## 2023-06-05 DIAGNOSIS — I10 ESSENTIAL HYPERTENSION, MALIGNANT: ICD-10-CM

## 2023-06-06 RX ORDER — POTASSIUM CHLORIDE 750 MG/1
TABLET, EXTENDED RELEASE ORAL
Qty: 90 TABLET | Refills: 1 | Status: SHIPPED | OUTPATIENT
Start: 2023-06-06 | End: 2023-08-30 | Stop reason: SDUPTHER

## 2023-06-09 DIAGNOSIS — Z71.89 COMPLEX CARE COORDINATION: ICD-10-CM

## 2023-06-30 ENCOUNTER — EXTERNAL CHRONIC CARE MANAGEMENT (OUTPATIENT)
Dept: FAMILY MEDICINE | Facility: CLINIC | Age: 88
End: 2023-06-30
Payer: MEDICARE

## 2023-06-30 PROCEDURE — G0511 PR CHRONIC CARE MGMT, RHC OR FQHC ONLY, 20 MINS OR MORE: ICD-10-PCS | Mod: ,,, | Performed by: NURSE PRACTITIONER

## 2023-06-30 PROCEDURE — G0511 CCM/BHI BY RHC/FQHC 20MIN MO: HCPCS | Mod: ,,, | Performed by: NURSE PRACTITIONER

## 2023-07-10 RX ORDER — TRAMADOL HYDROCHLORIDE 50 MG/1
50 TABLET ORAL EVERY 12 HOURS PRN
Qty: 60 TABLET | Refills: 2 | Status: SHIPPED | OUTPATIENT
Start: 2023-07-10 | End: 2023-10-23

## 2023-07-10 NOTE — TELEPHONE ENCOUNTER
----- Message from Leila Resendez sent at 7/10/2023  9:27 AM CDT -----  Patient needs refill on her tramadol sent to [facility] [phone].

## 2023-07-11 ENCOUNTER — OFFICE VISIT (OUTPATIENT)
Dept: FAMILY MEDICINE | Facility: CLINIC | Age: 88
End: 2023-07-11
Payer: MEDICARE

## 2023-07-11 VITALS
WEIGHT: 91.69 LBS | TEMPERATURE: 98 F | HEART RATE: 104 BPM | HEIGHT: 60 IN | DIASTOLIC BLOOD PRESSURE: 80 MMHG | RESPIRATION RATE: 16 BRPM | BODY MASS INDEX: 18 KG/M2 | SYSTOLIC BLOOD PRESSURE: 130 MMHG | OXYGEN SATURATION: 99 %

## 2023-07-11 DIAGNOSIS — M54.2 NECK PAIN: Primary | ICD-10-CM

## 2023-07-11 PROCEDURE — 96372 THER/PROPH/DIAG INJ SC/IM: CPT | Mod: ,,, | Performed by: NURSE PRACTITIONER

## 2023-07-11 PROCEDURE — 96372 PR INJECTION,THERAP/PROPH/DIAG2ST, IM OR SUBCUT: ICD-10-PCS | Mod: ,,, | Performed by: NURSE PRACTITIONER

## 2023-07-11 PROCEDURE — 1125F PR PAIN SEVERITY QUANTIFIED, PAIN PRESENT: ICD-10-PCS | Mod: ,,, | Performed by: NURSE PRACTITIONER

## 2023-07-11 PROCEDURE — 1160F RVW MEDS BY RX/DR IN RCRD: CPT | Mod: ,,, | Performed by: NURSE PRACTITIONER

## 2023-07-11 PROCEDURE — 99212 PR OFFICE/OUTPT VISIT, EST, LEVL II, 10-19 MIN: ICD-10-PCS | Mod: 25,,, | Performed by: NURSE PRACTITIONER

## 2023-07-11 PROCEDURE — 1160F PR REVIEW ALL MEDS BY PRESCRIBER/CLIN PHARMACIST DOCUMENTED: ICD-10-PCS | Mod: ,,, | Performed by: NURSE PRACTITIONER

## 2023-07-11 PROCEDURE — 1159F MED LIST DOCD IN RCRD: CPT | Mod: ,,, | Performed by: NURSE PRACTITIONER

## 2023-07-11 PROCEDURE — 1159F PR MEDICATION LIST DOCUMENTED IN MEDICAL RECORD: ICD-10-PCS | Mod: ,,, | Performed by: NURSE PRACTITIONER

## 2023-07-11 PROCEDURE — 99212 OFFICE O/P EST SF 10 MIN: CPT | Mod: 25,,, | Performed by: NURSE PRACTITIONER

## 2023-07-11 PROCEDURE — 1125F AMNT PAIN NOTED PAIN PRSNT: CPT | Mod: ,,, | Performed by: NURSE PRACTITIONER

## 2023-07-11 RX ORDER — METHOCARBAMOL 500 MG/1
500 TABLET, FILM COATED ORAL 4 TIMES DAILY PRN
Qty: 40 TABLET | Refills: 0 | Status: SHIPPED | OUTPATIENT
Start: 2023-07-11 | End: 2023-07-21

## 2023-07-11 RX ORDER — KETOROLAC TROMETHAMINE 30 MG/ML
30 INJECTION, SOLUTION INTRAMUSCULAR; INTRAVENOUS
Status: COMPLETED | OUTPATIENT
Start: 2023-07-11 | End: 2023-07-11

## 2023-07-11 RX ADMIN — KETOROLAC TROMETHAMINE 30 MG: 30 INJECTION, SOLUTION INTRAMUSCULAR; INTRAVENOUS at 10:07

## 2023-07-11 NOTE — PROGRESS NOTES
MILLIE King   [facility] [address] [place] [address]  [phone]      PATIENT NAME: Gisel Shelby  : 1925  DATE: 23  MRN: [id number]      Billing Provider: MILLIE King  Level of Service:   Patient PCP Information       Provider PCP Type    MILLIE King General            Reason for Visit / Chief Complaint: Neck Pain (Neck pain x 1 week.)       Update PCP  Update Chief Complaint         History of Present Illness / Problem Focused Workflow     Gisel Shelby presents to the clinic with Neck Pain (Neck pain x 1 week.)     Patient presents for evaluation neck pain.  She reports neck pain and stiffness for the last week she is been taking over-the-counter medications with little improvement she reports stiffness in her neck especially when turning from side to side.  No known injury.  She denies any blurry vision headache or syncope.    Discussed with patient this is likely muscle strain we will treat with p.r.n. medications.  Encouraged use of warming pad but use caution to decrease risk of burns      Review of Systems     Review of Systems   Musculoskeletal:  Positive for neck pain and neck stiffness.      Medical / Social / Family History     Past Medical History:   Diagnosis Date    Hypertension        Past Surgical History:   Procedure Laterality Date    APPENDECTOMY         Social History  Ms. Shelby  reports that she has never smoked. She has never used smokeless tobacco. She reports that she does not drink alcohol and does not use drugs.    Family History  Ms. Shelby's family history includes Diabetes in her brother and sister; Hypertension in her brother, mother, and sister.    Medications and Allergies     Medications  Outpatient Medications Marked as Taking for the 23 encounter (Office Visit) with MILLIE King   Medication Sig Dispense  Refill    alcohol swabs (DROPSAFE ALCOHOL PREP PADS) PadM USE 3 SWABS TOPICALLY AS DIRECTED ONCE DAILY 300 each 3    aspirin (ECOTRIN) 81 MG EC tablet Take 81 mg by mouth once daily.      blood glucose control, low (TRUE METRIX LEVEL 1) Soln 1 application by NOT APPLICABLE route daily as needed (meter contol). 1 each 3    blood-glucose meter (TRUE METRIX AIR GLUCOSE METER) Misc Inject 1 each into the skin Daily. 1 each 1    blood-glucose meter (TRUE METRIX GLUCOSE METER) kit Monitor glucose levels twice daily and prn 1 each 3    carvediloL (COREG) 3.125 MG tablet TAKE 1 TABLET TWICE DAILY WITH MEALS 180 tablet 1    cetirizine (ZYRTEC) 10 MG tablet TAKE 1 TABLET ONE TIME DAILY 90 tablet 1    diclofenac sodium (VOLTAREN) 1 % Gel Apply 2 g topically once daily. 150 g 1    fluticasone propionate (FLONASE) 50 mcg/actuation nasal spray 1 spray (50 mcg total) by Each Nostril route once daily. 16 g 3    hydroCHLOROthiazide (HYDRODIURIL) 25 MG tablet Take 1 tablet (25 mg total) by mouth once daily. 90 tablet 1    lisinopriL 10 MG tablet TAKE 1 TABLET ONE TIME DAILY 90 tablet 1    megestroL (MEGACE) 40 MG Tab Take 1 tablet (40 mg total) by mouth once daily. 90 tablet 1    potassium chloride SA (K-DUR,KLOR-CON M) 10 MEQ tablet TAKE 1 TABLET ONE TIME DAILY 90 tablet 1    simvastatin (ZOCOR) 5 MG tablet TAKE 1 TABLET EVERY EVENING 90 tablet 1    traMADoL (ULTRAM) 50 mg tablet Take 1 tablet (50 mg total) by mouth every 12 (twelve) hours as needed for Pain. 60 tablet 2    TRUE METRIX GLUCOSE TEST STRIP Strp TEST BLOOD SUGAR TWICE DAILY AS DIRECTED 200 strip 2    TRUEPLUS LANCETS 33 gauge Misc TEST BLOOD SUGAR TWO TIMES DAILY 200 each 2     Current Facility-Administered Medications for the 7/11/23 encounter (Office Visit) with MILLIE King   Medication Dose Route Frequency Provider Last Rate Last Admin    [COMPLETED] ketorolac injection 30 mg  30 mg Intramuscular 1 time in Clinic/HOD MILLIE King   30 mg at  07/11/23 1026       Allergies  Review of patient's allergies indicates:  No Known Allergies    Physical Examination     Vitals:    07/11/23 0959   BP: 130/80   Pulse: 104   Resp: 16   Temp: 98.3 °F (36.8 °C)     Physical Exam  Eyes:      Pupils: Pupils are equal, round, and reactive to light.   Cardiovascular:      Rate and Rhythm: Normal rate and regular rhythm.      Heart sounds: Normal heart sounds. No murmur heard.  Pulmonary:      Breath sounds: Normal breath sounds. No wheezing, rhonchi or rales.   Abdominal:      General: Bowel sounds are normal.   Musculoskeletal:         General: No swelling.      Cervical back: Normal range of motion and neck supple.   Skin:     General: Skin is warm and dry.   Neurological:      Mental Status: She is alert and oriented to person, place, and time.        Lab Results   Component Value Date    WBC 10.58 11/18/2021    HGB 11.8 (L) 11/18/2021    HCT 38.8 11/18/2021    MCV 95.8 11/18/2021     11/18/2021        Sodium   Date Value Ref Range Status   05/09/2023 138 136 - 145 mmol/L Final     Potassium   Date Value Ref Range Status   05/09/2023 3.9 3.5 - 5.1 mmol/L Final     Chloride   Date Value Ref Range Status   05/09/2023 109 (H) 98 - 107 mmol/L Final     CO2   Date Value Ref Range Status   05/09/2023 27 21 - 32 mmol/L Final     Glucose   Date Value Ref Range Status   05/09/2023 75 74 - 106 mg/dL Final     BUN   Date Value Ref Range Status   05/09/2023 19 (H) 7 - 18 mg/dL Final     Creatinine   Date Value Ref Range Status   05/09/2023 0.80 0.55 - 1.02 mg/dL Final     Calcium   Date Value Ref Range Status   05/09/2023 9.5 8.5 - 10.1 mg/dL Final     Total Protein   Date Value Ref Range Status   05/09/2023 7.4 6.4 - 8.2 g/dL Final     Albumin   Date Value Ref Range Status   05/09/2023 3.1 (L) 3.5 - 5.0 g/dL Final     Bilirubin, Total   Date Value Ref Range Status   05/09/2023 0.5 >0.0 - 1.2 mg/dL Final     Alk Phos   Date Value Ref Range Status   05/09/2023 67 55 - 142 U/L  Final     AST   Date Value Ref Range Status   05/09/2023 14 (L) 15 - 37 U/L Final     ALT   Date Value Ref Range Status   05/09/2023 18 13 - 56 U/L Final     Anion Gap   Date Value Ref Range Status   05/09/2023 6 (L) 7 - 16 mmol/L Final     eGFR   Date Value Ref Range Status   05/09/2023 67 >=60 mL/min/1.73m2 Final      Lab Results   Component Value Date    HGBA1C 5.4 01/12/2023      Lab Results   Component Value Date    CHOL 129 05/09/2023    CHOL 135 01/12/2023    CHOL 146 10/31/2022     Lab Results   Component Value Date    HDL 64 (H) 05/09/2023    HDL 75 (H) 01/12/2023    HDL 83 (H) 10/31/2022     Lab Results   Component Value Date    LDLCALC 57 05/09/2023    LDLCALC 49 01/12/2023    LDLCALC 51 10/31/2022     No results found for: DLDL  Lab Results   Component Value Date    TRIG 41 05/09/2023    TRIG 54 01/12/2023    TRIG 60 10/31/2022     Lab Results   Component Value Date    CHOLHDL 2.0 05/09/2023    CHOLHDL 1.8 01/12/2023    CHOLHDL 1.8 10/31/2022      No results found for: TSH, C4DUXSM, V6CRXWU, THYROIDAB, FREET4     Assessment and Plan (including Health Maintenance)      Problem List  Smart Sets  Document Outside HM   :    Plan:     1. Neck pain  -     ketorolac injection 30 mg  -     methocarbamoL (ROBAXIN) 500 MG Tab; Take 1 tablet (500 mg total) by mouth 4 (four) times daily as needed (neck pain).  Dispense: 40 tablet; Refill: 0    Other orders  -     nitroglycerin (NITROMIST) 400 mcg/spray; Place 400 mcg (1 spray total) under the tongue every 5 (five) minutes as needed for Chest pain.  Dispense: 4.1 g; Refill: 1         There are no Patient Instructions on file for this visit.     Health Maintenance Due   Topic Date Due    Eye Exam  Never done    Hemoglobin A1c  07/12/2023    Diabetes Urine Screening  08/04/2023         Health Maintenance Topics with due status: Not Due       Topic Last Completion Date    TETANUS VACCINE 04/19/2017    Influenza Vaccine 10/18/2022    Lipid Panel 05/09/2023       Future  Appointments   Date Time Provider Department Center   8/30/2023  9:00 AM MILLIE King [facility] [place]   9/26/2023 10:00 AM AWV NURSE, [facility] [place]            Signature:  MILLIE King [facility] [address] [place] [address]  [phone]    Date of encounter: 7/11/23

## 2023-07-18 ENCOUNTER — OFFICE VISIT (OUTPATIENT)
Dept: FAMILY MEDICINE | Facility: CLINIC | Age: 88
End: 2023-07-18
Payer: MEDICARE

## 2023-07-18 VITALS
BODY MASS INDEX: 16.3 KG/M2 | HEIGHT: 60 IN | SYSTOLIC BLOOD PRESSURE: 131 MMHG | WEIGHT: 83 LBS | DIASTOLIC BLOOD PRESSURE: 81 MMHG | HEART RATE: 102 BPM | RESPIRATION RATE: 18 BRPM | OXYGEN SATURATION: 97 % | TEMPERATURE: 98 F

## 2023-07-18 DIAGNOSIS — M25.432 PAIN AND SWELLING OF LEFT WRIST: ICD-10-CM

## 2023-07-18 DIAGNOSIS — M25.532 PAIN AND SWELLING OF LEFT WRIST: ICD-10-CM

## 2023-07-18 DIAGNOSIS — M10.032 ACUTE IDIOPATHIC GOUT OF LEFT WRIST: Primary | ICD-10-CM

## 2023-07-18 PROCEDURE — 1101F PT FALLS ASSESS-DOCD LE1/YR: CPT | Mod: ,,, | Performed by: NURSE PRACTITIONER

## 2023-07-18 PROCEDURE — 99213 PR OFFICE/OUTPT VISIT, EST, LEVL III, 20-29 MIN: ICD-10-PCS | Mod: 25,,, | Performed by: NURSE PRACTITIONER

## 2023-07-18 PROCEDURE — 96372 THER/PROPH/DIAG INJ SC/IM: CPT | Mod: ,,, | Performed by: NURSE PRACTITIONER

## 2023-07-18 PROCEDURE — 96372 PR INJECTION,THERAP/PROPH/DIAG2ST, IM OR SUBCUT: ICD-10-PCS | Mod: ,,, | Performed by: NURSE PRACTITIONER

## 2023-07-18 PROCEDURE — 3288F FALL RISK ASSESSMENT DOCD: CPT | Mod: ,,, | Performed by: NURSE PRACTITIONER

## 2023-07-18 PROCEDURE — 99213 OFFICE O/P EST LOW 20 MIN: CPT | Mod: 25,,, | Performed by: NURSE PRACTITIONER

## 2023-07-18 PROCEDURE — 1159F PR MEDICATION LIST DOCUMENTED IN MEDICAL RECORD: ICD-10-PCS | Mod: ,,, | Performed by: NURSE PRACTITIONER

## 2023-07-18 PROCEDURE — 1159F MED LIST DOCD IN RCRD: CPT | Mod: ,,, | Performed by: NURSE PRACTITIONER

## 2023-07-18 PROCEDURE — 3288F PR FALLS RISK ASSESSMENT DOCUMENTED: ICD-10-PCS | Mod: ,,, | Performed by: NURSE PRACTITIONER

## 2023-07-18 PROCEDURE — 1101F PR PT FALLS ASSESS DOC 0-1 FALLS W/OUT INJ PAST YR: ICD-10-PCS | Mod: ,,, | Performed by: NURSE PRACTITIONER

## 2023-07-18 RX ORDER — DEXAMETHASONE SODIUM PHOSPHATE 4 MG/ML
4 INJECTION, SOLUTION INTRA-ARTICULAR; INTRALESIONAL; INTRAMUSCULAR; INTRAVENOUS; SOFT TISSUE
Status: COMPLETED | OUTPATIENT
Start: 2023-07-18 | End: 2023-07-18

## 2023-07-18 RX ORDER — COLCHICINE 0.6 MG/1
TABLET ORAL
Qty: 8 TABLET | Refills: 0 | Status: SHIPPED | OUTPATIENT
Start: 2023-07-18 | End: 2023-08-30

## 2023-07-18 RX ORDER — DICLOFENAC SODIUM 50 MG/1
50 TABLET, DELAYED RELEASE ORAL 2 TIMES DAILY
Qty: 10 TABLET | Refills: 0 | Status: SHIPPED | OUTPATIENT
Start: 2023-07-18 | End: 2023-08-30

## 2023-07-18 RX ORDER — METHYLPREDNISOLONE 4 MG/1
TABLET ORAL
Qty: 21 TABLET | Refills: 0 | Status: SHIPPED | OUTPATIENT
Start: 2023-07-18

## 2023-07-18 RX ADMIN — DEXAMETHASONE SODIUM PHOSPHATE 4 MG: 4 INJECTION, SOLUTION INTRA-ARTICULAR; INTRALESIONAL; INTRAMUSCULAR; INTRAVENOUS; SOFT TISSUE at 10:07

## 2023-07-18 NOTE — PROGRESS NOTES
Carlotta Cardona DNP   [address] [place] [address]     PATIENT NAME: Gisel Shelby  : 1925  DATE: 23  MRN: [id number]      Billing Provider: Carlotta Cardona DNP  Level of Service:   Patient PCP Information       Provider PCP Type    MILLIE [facility]            Reason for Visit / Chief Complaint: Leg Pain and hand swollen       Update PCP  Update Chief Complaint         History of Present Illness / Problem Focused Workflow     Gisel Shelby presents to the clinic with Leg Pain and hand swollen     Leg Pain     Review of Systems     Review of Systems   Musculoskeletal:  Positive for leg pain.      Medical / Social / Family History     Past Medical History:   Diagnosis Date    Hypertension        Past Surgical History:   Procedure Laterality Date    APPENDECTOMY         Social History  Ms.  reports that she has never smoked. She has never used smokeless tobacco. She reports that she does not drink alcohol and does not use drugs.    Family History  Ms.'s family history includes Diabetes in her brother and sister; Hypertension in her brother, mother, and sister.    Medications and Allergies     Medications  No outpatient medications have been marked as taking for the 23 encounter (Office Visit) with Carlotta Cardona DNP.     Current Facility-Administered Medications for the 23 encounter (Office Visit) with Carlotta Cardona DNP   Medication Dose Route Frequency Provider Last Rate Last Admin    [COMPLETED] dexAMETHasone injection 4 mg  4 mg Intramuscular 1 time in Clinic/HOD Carlotta Cardona DNP   4 mg at 23 1015       Allergies  Review of patient's allergies indicates:  No Known Allergies    Physical Examination   /81 (BP Location: Left arm, Patient Position: Sitting, BP Method: Medium (Automatic))   Pulse 102   Temp 98.2 °F (36.8 °C) (Oral)   Resp 18   Ht 5' (1.524 m)   Wt 37.6 kg (83 lb)   SpO2 97%   BMI 16.21 kg/m²    Physical  Exam  Constitutional:       General: She is not in acute distress.     Appearance: Normal appearance.   HENT:      Head: Normocephalic.   Eyes:      Pupils: Pupils are equal, round, and reactive to light.   Cardiovascular:      Rate and Rhythm: Normal rate and regular rhythm.      Pulses: Normal pulses.      Heart sounds: Murmur heard.   Pulmonary:      Effort: Pulmonary effort is normal.      Breath sounds: Normal breath sounds. No wheezing or rhonchi.   Abdominal:      General: Bowel sounds are normal. There is no distension.      Hernia: No hernia is present.   Musculoskeletal:         General: Swelling and tenderness present.      Right lower leg: No edema.      Left lower leg: No edema.      Comments: In wheelchair. With daughter  Rt wrist swelling tender to touch. Erythematous and hot   Skin:     General: Skin is warm and dry.   Neurological:      General: No focal deficit present.      Mental Status: She is alert and oriented to person, place, and time.   Psychiatric:         Mood and Affect: Mood normal.         Behavior: Behavior normal.         Thought Content: Thought content normal.         Judgment: Judgment normal.      Comments: Baseline for age.        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    Pain and swelling of left wrist    Relevant Orders    Uric Acid       Health Maintenance Topics with due status: Not Due       Topic Last Completion Date    TETANUS VACCINE 04/19/2017    Influenza Vaccine 10/18/2022    Lipid Panel 05/09/2023       Future Appointments   Date Time Provider Department Center   8/30/2023  9:00 AM MILLIE King [facility] [place]    9/26/2023 10:00 AM AWV NURSE, [facility]            Signature:  Carlotta Cardona, DNP      [address] [place] [address]    Date of encounter: 7/18/23

## 2023-07-31 ENCOUNTER — EXTERNAL CHRONIC CARE MANAGEMENT (OUTPATIENT)
Dept: FAMILY MEDICINE | Facility: CLINIC | Age: 88
End: 2023-07-31
Payer: MEDICARE

## 2023-07-31 PROCEDURE — G0511 PR CHRONIC CARE MGMT, RHC OR FQHC ONLY, 20 MINS OR MORE: ICD-10-PCS | Mod: ,,, | Performed by: NURSE PRACTITIONER

## 2023-07-31 PROCEDURE — G0511 CCM/BHI BY RHC/FQHC 20MIN MO: HCPCS | Mod: ,,, | Performed by: NURSE PRACTITIONER

## 2023-08-07 ENCOUNTER — PATIENT OUTREACH (OUTPATIENT)
Dept: ADMINISTRATIVE | Facility: HOSPITAL | Age: 88
End: 2023-08-07

## 2023-08-07 NOTE — PROGRESS NOTES
08/07/2023   --Chart accessed for: RECORD REQUEST  --Care Gaps addressed: DM EYE EXAM  Outreach made to patient via CHART REVIEW   Care Everywhere updates requested and reviewed.  Media reports reviewed.  [facility] and [facility] reviewed.  Immunization Database (Immprint/MIXX) reviewed. Vaccinations uploaded:NONE  [facility] abstracted.  Requested EYE EXAM records from EYE CLIN[facility]

## 2023-08-07 NOTE — LETTER
AUTHORIZATION FOR RELEASE OF   CONFIDENTIAL INFORMATION    Dear [facility],    We are seeing Gisel Shelby, date of birth 1925, in the clinic at [facility]. Carlotta Snell ACNP is the patient's PCP. Gisel Shelby has an outstanding lab/procedure at the time we reviewed her chart. In order to help keep her health information updated, she has authorized us to request the following medical record(s):        (  )  MAMMOGRAM                                      (  )  COLONOSCOPY      (  )  PAP SMEAR                                          (  )  OUTSIDE LAB RESULTS     (  )  DEXA SCAN                                          (X)  EYE EXAM            (  )  FOOT EXAM                                          (  )  ENTIRE RECORD     (  )  OUTSIDE IMMUNIZATIONS                 (  )  _______________         Please fax records to [facility], Vaughan, Jessica S, ACNP, [phone]     If you have any questions, please contact Carlotta Garcias at [phone].           Patient Name: Gisel Shelby  : 1925  Patient Phone #: [phone]

## 2023-08-08 ENCOUNTER — PATIENT OUTREACH (OUTPATIENT)
Dept: ADMINISTRATIVE | Facility: HOSPITAL | Age: 88
End: 2023-08-08

## 2023-08-08 LAB
LEFT EYE DM RETINOPATHY: NEGATIVE
RIGHT EYE DM RETINOPATHY: NEGATIVE

## 2023-08-08 NOTE — PROGRESS NOTES
08/08/2023   --Chart accessed for: RECORD UPLOAD  --Care Gaps addressed: DM EYE EXAM REPORT  Outreach made to patient via CHART REVIEW  Care Everywhere updates requested and reviewed.  Media reports reviewed.  [facility] and [facility] reviewed.  Immunization Database (Immprint/MIXX) reviewed. Vaccinations uploaded: NONE  HM updated with external DM EYE EXAM Report  [facility] abstracted.

## 2023-08-30 ENCOUNTER — OFFICE VISIT (OUTPATIENT)
Dept: FAMILY MEDICINE | Facility: CLINIC | Age: 88
End: 2023-08-30
Payer: MEDICARE

## 2023-08-30 VITALS
TEMPERATURE: 100 F | HEIGHT: 60 IN | DIASTOLIC BLOOD PRESSURE: 70 MMHG | OXYGEN SATURATION: 95 % | WEIGHT: 87.69 LBS | HEART RATE: 80 BPM | BODY MASS INDEX: 17.22 KG/M2 | RESPIRATION RATE: 16 BRPM | SYSTOLIC BLOOD PRESSURE: 144 MMHG

## 2023-08-30 DIAGNOSIS — J30.2 SEASONAL ALLERGIES: ICD-10-CM

## 2023-08-30 DIAGNOSIS — M25.432 PAIN AND SWELLING OF LEFT WRIST: ICD-10-CM

## 2023-08-30 DIAGNOSIS — I10 ESSENTIAL HYPERTENSION, MALIGNANT: Primary | ICD-10-CM

## 2023-08-30 DIAGNOSIS — M25.532 PAIN AND SWELLING OF LEFT WRIST: ICD-10-CM

## 2023-08-30 DIAGNOSIS — R73.01 IFG (IMPAIRED FASTING GLUCOSE): ICD-10-CM

## 2023-08-30 DIAGNOSIS — E78.5 HYPERLIPIDEMIA, UNSPECIFIED HYPERLIPIDEMIA TYPE: ICD-10-CM

## 2023-08-30 LAB
ALBUMIN SERPL BCP-MCNC: 2.6 G/DL (ref 3.5–5)
ALBUMIN/GLOB SERPL: 0.5 {RATIO}
ALP SERPL-CCNC: 66 U/L (ref 55–142)
ALT SERPL W P-5'-P-CCNC: 26 U/L (ref 13–56)
ANION GAP SERPL CALCULATED.3IONS-SCNC: 12 MMOL/L (ref 7–16)
AST SERPL W P-5'-P-CCNC: 17 U/L (ref 15–37)
BILIRUB SERPL-MCNC: 0.4 MG/DL (ref ?–1.2)
BUN SERPL-MCNC: 16 MG/DL (ref 7–18)
BUN/CREAT SERPL: 21 (ref 6–20)
CALCIUM SERPL-MCNC: 9 MG/DL (ref 8.5–10.1)
CHLORIDE SERPL-SCNC: 104 MMOL/L (ref 98–107)
CHOLEST SERPL-MCNC: 113 MG/DL (ref 0–200)
CHOLEST/HDLC SERPL: 1.7 {RATIO}
CO2 SERPL-SCNC: 24 MMOL/L (ref 21–32)
CREAT SERPL-MCNC: 0.76 MG/DL (ref 0.55–1.02)
CREAT UR-MCNC: 81 MG/DL (ref 28–219)
EGFR (NO RACE VARIABLE) (RUSH/TITUS): 71 ML/MIN/1.73M2
EST. AVERAGE GLUCOSE BLD GHB EST-MCNC: 110 MG/DL
GLOBULIN SER-MCNC: 4.9 G/DL (ref 2–4)
GLUCOSE SERPL-MCNC: 74 MG/DL (ref 74–106)
HBA1C MFR BLD HPLC: 5.9 % (ref 4.5–6.6)
HDLC SERPL-MCNC: 65 MG/DL (ref 40–60)
LDLC SERPL CALC-MCNC: 39 MG/DL
MICROALBUMIN UR-MCNC: 3.1 MG/DL (ref 0–2.8)
MICROALBUMIN/CREAT RATIO PNL UR: 38.3 MG/G (ref 0–30)
NONHDLC SERPL-MCNC: 48 MG/DL
POTASSIUM SERPL-SCNC: 3.9 MMOL/L (ref 3.5–5.1)
PROT SERPL-MCNC: 7.5 G/DL (ref 6.4–8.2)
SODIUM SERPL-SCNC: 136 MMOL/L (ref 136–145)
TRIGL SERPL-MCNC: 43 MG/DL (ref 35–150)
URATE SERPL-MCNC: 4.7 MG/DL (ref 2.6–6)
VLDLC SERPL-MCNC: 9 MG/DL

## 2023-08-30 PROCEDURE — 99213 PR OFFICE/OUTPT VISIT, EST, LEVL III, 20-29 MIN: ICD-10-PCS | Mod: ,,, | Performed by: NURSE PRACTITIONER

## 2023-08-30 PROCEDURE — 1101F PR PT FALLS ASSESS DOC 0-1 FALLS W/OUT INJ PAST YR: ICD-10-PCS | Mod: ,,, | Performed by: NURSE PRACTITIONER

## 2023-08-30 PROCEDURE — 82043 MICROALBUMIN / CREATININE RATIO URINE: ICD-10-PCS | Mod: ,,, | Performed by: CLINICAL MEDICAL LABORATORY

## 2023-08-30 PROCEDURE — 80053 COMPREHENSIVE METABOLIC PANEL: ICD-10-PCS | Mod: ,,, | Performed by: CLINICAL MEDICAL LABORATORY

## 2023-08-30 PROCEDURE — 82570 ASSAY OF URINE CREATININE: CPT | Mod: ,,, | Performed by: CLINICAL MEDICAL LABORATORY

## 2023-08-30 PROCEDURE — 3288F PR FALLS RISK ASSESSMENT DOCUMENTED: ICD-10-PCS | Mod: ,,, | Performed by: NURSE PRACTITIONER

## 2023-08-30 PROCEDURE — 99213 OFFICE O/P EST LOW 20 MIN: CPT | Mod: ,,, | Performed by: NURSE PRACTITIONER

## 2023-08-30 PROCEDURE — 82570 MICROALBUMIN / CREATININE RATIO URINE: ICD-10-PCS | Mod: ,,, | Performed by: CLINICAL MEDICAL LABORATORY

## 2023-08-30 PROCEDURE — 1159F MED LIST DOCD IN RCRD: CPT | Mod: ,,, | Performed by: NURSE PRACTITIONER

## 2023-08-30 PROCEDURE — 82043 UR ALBUMIN QUANTITATIVE: CPT | Mod: ,,, | Performed by: CLINICAL MEDICAL LABORATORY

## 2023-08-30 PROCEDURE — 80053 COMPREHEN METABOLIC PANEL: CPT | Mod: ,,, | Performed by: CLINICAL MEDICAL LABORATORY

## 2023-08-30 PROCEDURE — 83036 HEMOGLOBIN A1C: ICD-10-PCS | Mod: ,,, | Performed by: CLINICAL MEDICAL LABORATORY

## 2023-08-30 PROCEDURE — 84550 ASSAY OF BLOOD/URIC ACID: CPT | Mod: ,,, | Performed by: CLINICAL MEDICAL LABORATORY

## 2023-08-30 PROCEDURE — 3288F FALL RISK ASSESSMENT DOCD: CPT | Mod: ,,, | Performed by: NURSE PRACTITIONER

## 2023-08-30 PROCEDURE — 80061 LIPID PANEL: CPT | Mod: ,,, | Performed by: CLINICAL MEDICAL LABORATORY

## 2023-08-30 PROCEDURE — 1101F PT FALLS ASSESS-DOCD LE1/YR: CPT | Mod: ,,, | Performed by: NURSE PRACTITIONER

## 2023-08-30 PROCEDURE — 1160F PR REVIEW ALL MEDS BY PRESCRIBER/CLIN PHARMACIST DOCUMENTED: ICD-10-PCS | Mod: ,,, | Performed by: NURSE PRACTITIONER

## 2023-08-30 PROCEDURE — 80061 LIPID PANEL: ICD-10-PCS | Mod: ,,, | Performed by: CLINICAL MEDICAL LABORATORY

## 2023-08-30 PROCEDURE — 84550 URIC ACID: ICD-10-PCS | Mod: ,,, | Performed by: CLINICAL MEDICAL LABORATORY

## 2023-08-30 PROCEDURE — 1159F PR MEDICATION LIST DOCUMENTED IN MEDICAL RECORD: ICD-10-PCS | Mod: ,,, | Performed by: NURSE PRACTITIONER

## 2023-08-30 PROCEDURE — 1160F RVW MEDS BY RX/DR IN RCRD: CPT | Mod: ,,, | Performed by: NURSE PRACTITIONER

## 2023-08-30 PROCEDURE — 83036 HEMOGLOBIN GLYCOSYLATED A1C: CPT | Mod: ,,, | Performed by: CLINICAL MEDICAL LABORATORY

## 2023-08-30 RX ORDER — METHOCARBAMOL 500 MG/1
500 TABLET, FILM COATED ORAL 4 TIMES DAILY PRN
COMMUNITY

## 2023-08-30 RX ORDER — MEGESTROL ACETATE 40 MG/1
40 TABLET ORAL DAILY
Qty: 90 TABLET | Refills: 1 | Status: SHIPPED | OUTPATIENT
Start: 2023-08-30 | End: 2024-03-07

## 2023-08-30 RX ORDER — HYDROCHLOROTHIAZIDE 25 MG/1
25 TABLET ORAL DAILY
Qty: 90 TABLET | Refills: 1 | Status: SHIPPED | OUTPATIENT
Start: 2023-08-30

## 2023-08-30 RX ORDER — CARVEDILOL 3.12 MG/1
3.12 TABLET ORAL 2 TIMES DAILY WITH MEALS
Qty: 180 TABLET | Refills: 1 | Status: SHIPPED | OUTPATIENT
Start: 2023-08-30 | End: 2024-01-29

## 2023-08-30 RX ORDER — SIMVASTATIN 5 MG/1
5 TABLET, FILM COATED ORAL NIGHTLY
Qty: 90 TABLET | Refills: 1 | Status: SHIPPED | OUTPATIENT
Start: 2023-08-30 | End: 2024-01-24

## 2023-08-30 RX ORDER — POTASSIUM CHLORIDE 750 MG/1
10 TABLET, EXTENDED RELEASE ORAL DAILY
Qty: 90 TABLET | Refills: 1 | Status: SHIPPED | OUTPATIENT
Start: 2023-08-30

## 2023-08-30 RX ORDER — CETIRIZINE HYDROCHLORIDE 10 MG/1
10 TABLET ORAL DAILY
Qty: 90 TABLET | Refills: 1 | Status: SHIPPED | OUTPATIENT
Start: 2023-08-30

## 2023-08-30 RX ORDER — LISINOPRIL 10 MG/1
10 TABLET ORAL DAILY
Qty: 90 TABLET | Refills: 1 | Status: SHIPPED | OUTPATIENT
Start: 2023-08-30 | End: 2024-01-24

## 2023-08-30 NOTE — ASSESSMENT & PLAN NOTE
BP Readings from Last 3 Encounters:   08/30/23 (!) 144/70   07/18/23 131/81   07/11/23 130/80     The current medical regimen is effective;  continue present plan and medications.

## 2023-08-30 NOTE — PROGRESS NOTES
MILLIE King   [facility] [address] [place] [address]  [phone]      PATIENT NAME: Giesl Shelby  : 1925  DATE: 23  MRN: [id number]      Billing Provider: MILLIE King  Level of Service:   Patient PCP Information       Provider PCP Type    MILLIE [facility]            Reason for Visit / Chief Complaint: Follow-up, Hypertension, and Hyperlipidemia       Update PCP  Update Chief Complaint         History of Present Illness / Problem Focused Workflow     Gisel Shelby presents to the clinic with Follow-up, Hypertension, and Hyperlipidemia     Pt presents for routine follow up with lab and med refills. Overall doing well.      BP appears  controlled today. Home meds reviewed  The current medical regimen is effective;  continue present plan and medications. Recommended patient to check home readings to monitor and see me for followup as scheduled or sooner as needed.   Discussed sodium restriction, maintaining ideal body weight and regular exercise program as physiologic means to continue to achieve blood pressure control in addition to medication compliance.  Patient was educated that both decongestant and anti-inflammatory medication may raise blood pressure.    Discussed need for low fat/low cholesterol diet, regular exercise, and weight control.   Cardiovascular risk and specific lipid/LDL goals reviewed.    Patient has gained approximately 4 lb since last appointment.  Encouraged diet as tolerated  She also reports a fall on Friday she was unsure how she fell she denies any dizziness we will weakness at the time of fall she states that she was going to get her walker suddenly she was on ground she was seen at the emergency department at [facility].  Encouraged patient to please use caution        Review of Systems     Review of Systems   Constitutional:  Negative for  fatigue and fever.   HENT:  Negative for nasal congestion and sore throat.    Eyes:  Negative for visual disturbance.   Respiratory:  Negative for chest tightness and shortness of breath.    Cardiovascular:  Negative for chest pain and leg swelling.   Gastrointestinal:  Negative for abdominal pain, change in bowel habit and change in bowel habit.   Endocrine: Negative for polydipsia, polyphagia and polyuria.   Genitourinary:  Negative for dysuria and hematuria.   Musculoskeletal:  Negative for back pain and leg pain.   Integumentary:  Negative for rash.   Neurological:  Negative for dizziness, syncope, weakness and light-headedness.        Medical / Social / Family History     Past Medical History:   Diagnosis Date    Hypertension        Past Surgical History:   Procedure Laterality Date    APPENDECTOMY         Social History  Ms. Shelby  reports that she has never smoked. She has never used smokeless tobacco. She reports that she does not drink alcohol and does not use drugs.    Family History  Ms. Shelby's family history includes Diabetes in her brother and sister; Hypertension in her brother, mother, and sister.    Medications and Allergies     Medications  Outpatient Medications Marked as Taking for the 8/30/23 encounter (Office Visit) with Carlotta Snell ACNP   Medication Sig Dispense Refill    alcohol swabs (DROPSAFE ALCOHOL PREP PADS) PadM USE 3 SWABS TOPICALLY AS DIRECTED ONCE DAILY 300 each 3    aspirin (ECOTRIN) 81 MG EC tablet Take 81 mg by mouth once daily.      blood glucose control, low (TRUE METRIX LEVEL 1) Soln 1 application by NOT APPLICABLE route daily as needed (meter contol). 1 each 3    blood-glucose meter (TRUE METRIX AIR GLUCOSE METER) Misc Inject 1 each into the skin Daily. 1 each 1    blood-glucose meter (TRUE METRIX GLUCOSE METER) kit Monitor glucose levels twice daily and prn 1 each 3    diclofenac sodium (VOLTAREN) 1 % Gel Apply 2 g topically once daily. 150 g 1    fluticasone  propionate (FLONASE) 50 mcg/actuation nasal spray 1 spray (50 mcg total) by Each Nostril route once daily. 16 g 3    methocarbamoL (ROBAXIN) 500 MG Tab Take 500 mg by mouth 4 (four) times daily as needed (for neck pain).      nitroglycerin (NITROMIST) 400 mcg/spray Place 400 mcg (1 spray total) under the tongue every 5 (five) minutes as needed for Chest pain. 4.1 g 1    traMADoL (ULTRAM) 50 mg tablet Take 1 tablet (50 mg total) by mouth every 12 (twelve) hours as needed for Pain. 60 tablet 2    TRUE METRIX GLUCOSE TEST STRIP Strp TEST BLOOD SUGAR TWICE DAILY AS DIRECTED 200 strip 2    TRUEPLUS LANCETS 33 gauge Misc TEST BLOOD SUGAR TWO TIMES DAILY 200 each 2    [DISCONTINUED] carvediloL (COREG) 3.125 MG tablet TAKE 1 TABLET TWICE DAILY WITH MEALS 180 tablet 1    [DISCONTINUED] cetirizine (ZYRTEC) 10 MG tablet TAKE 1 TABLET ONE TIME DAILY 90 tablet 1    [DISCONTINUED] hydroCHLOROthiazide (HYDRODIURIL) 25 MG tablet Take 1 tablet (25 mg total) by mouth once daily. 90 tablet 1    [DISCONTINUED] lisinopriL 10 MG tablet TAKE 1 TABLET ONE TIME DAILY 90 tablet 1    [DISCONTINUED] megestroL (MEGACE) 40 MG Tab Take 1 tablet (40 mg total) by mouth once daily. 90 tablet 1    [DISCONTINUED] potassium chloride SA (K-DUR,KLOR-CON M) 10 MEQ tablet TAKE 1 TABLET ONE TIME DAILY 90 tablet 1    [DISCONTINUED] simvastatin (ZOCOR) 5 MG tablet TAKE 1 TABLET EVERY EVENING 90 tablet 1       Allergies  Review of patient's allergies indicates:  No Known Allergies    Physical Examination     Vitals:    08/30/23 0918   BP: (!) 144/70   Pulse: 80   Resp: 16   Temp: 99.8 °F (37.7 °C)     Physical Exam  Constitutional:       General: She is not in acute distress.     Comments: Thin frail weak appearing   Eyes:      Pupils: Pupils are equal, round, and reactive to light.   Cardiovascular:      Rate and Rhythm: Normal rate and regular rhythm.      Heart sounds: Normal heart sounds. No murmur heard.  Pulmonary:      Breath sounds: Normal breath  sounds. No wheezing, rhonchi or rales.   Abdominal:      General: Bowel sounds are normal.   Musculoskeletal:         General: No swelling.      Cervical back: Normal range of motion and neck supple.   Skin:     General: Skin is warm and dry.   Neurological:      Mental Status: She is alert and oriented to person, place, and time.          Lab Results   Component Value Date    WBC 10.58 11/18/2021    HGB 11.8 (L) 11/18/2021    HCT 38.8 11/18/2021    MCV 95.8 11/18/2021     11/18/2021        Sodium   Date Value Ref Range Status   05/09/2023 138 136 - 145 mmol/L Final     Potassium   Date Value Ref Range Status   05/09/2023 3.9 3.5 - 5.1 mmol/L Final     Chloride   Date Value Ref Range Status   05/09/2023 109 (H) 98 - 107 mmol/L Final     CO2   Date Value Ref Range Status   05/09/2023 27 21 - 32 mmol/L Final     Glucose   Date Value Ref Range Status   05/09/2023 75 74 - 106 mg/dL Final     BUN   Date Value Ref Range Status   05/09/2023 19 (H) 7 - 18 mg/dL Final     Creatinine   Date Value Ref Range Status   05/09/2023 0.80 0.55 - 1.02 mg/dL Final     Calcium   Date Value Ref Range Status   05/09/2023 9.5 8.5 - 10.1 mg/dL Final     Total Protein   Date Value Ref Range Status   05/09/2023 7.4 6.4 - 8.2 g/dL Final     Albumin   Date Value Ref Range Status   05/09/2023 3.1 (L) 3.5 - 5.0 g/dL Final     Bilirubin, Total   Date Value Ref Range Status   05/09/2023 0.5 >0.0 - 1.2 mg/dL Final     Alk Phos   Date Value Ref Range Status   05/09/2023 67 55 - 142 U/L Final     AST   Date Value Ref Range Status   05/09/2023 14 (L) 15 - 37 U/L Final     ALT   Date Value Ref Range Status   05/09/2023 18 13 - 56 U/L Final     Anion Gap   Date Value Ref Range Status   05/09/2023 6 (L) 7 - 16 mmol/L Final     eGFR   Date Value Ref Range Status   05/09/2023 67 >=60 mL/min/1.73m2 Final      Lab Results   Component Value Date    HGBA1C 5.4 01/12/2023      Lab Results   Component Value Date    CHOL 129 05/09/2023    CHOL 135  "01/12/2023    CHOL 146 10/31/2022     Lab Results   Component Value Date    HDL 64 (H) 05/09/2023    HDL 75 (H) 01/12/2023    HDL 83 (H) 10/31/2022     Lab Results   Component Value Date    LDLCALC 57 05/09/2023    LDLCALC 49 01/12/2023    LDLCALC 51 10/31/2022     No results found for: "DLDL"  Lab Results   Component Value Date    TRIG 41 05/09/2023    TRIG 54 01/12/2023    TRIG 60 10/31/2022     Lab Results   Component Value Date    CHOLHDL 2.0 05/09/2023    CHOLHDL 1.8 01/12/2023    CHOLHDL 1.8 10/31/2022      No results found for: "TSH", "Q2PSOJK", "D8DYPGN", "THYROIDAB", "FREET4"     Assessment and Plan (including Health Maintenance)      Problem List  Smart Sets  Document Outside HM   :    Plan:     1. Essential hypertension, malignant  Comments:  review and cont home meds  bp appears well controlled   Overview:  review and cont home meds  bp appears well controlled     Assessment & Plan:  BP Readings from Last 3 Encounters:   08/30/23 (!) 144/70   07/18/23 131/81   07/11/23 130/80     The current medical regimen is effective;  continue present plan and medications.      Orders:  -     Comprehensive Metabolic Panel; Future; Expected date: 08/30/2023  -     Microalbumin/Creatinine Ratio, Urine; Future; Expected date: 08/30/2023  -     lisinopriL 10 MG tablet; Take 1 tablet (10 mg total) by mouth once daily.  Dispense: 90 tablet; Refill: 1  -     potassium chloride SA (K-DUR,KLOR-CON M) 10 MEQ tablet; Take 1 tablet (10 mEq total) by mouth once daily.  Dispense: 90 tablet; Refill: 1  -     hydroCHLOROthiazide (HYDRODIURIL) 25 MG tablet; Take 1 tablet (25 mg total) by mouth once daily.  Dispense: 90 tablet; Refill: 1    2. Hyperlipidemia, unspecified hyperlipidemia type  Comments:  lab today  cont statin therapy  Assessment & Plan:  Lab Results   Component Value Date    CHOL 129 05/09/2023    CHOL 135 01/12/2023    CHOL 146 10/31/2022     Lab Results   Component Value Date    HDL 64 (H) 05/09/2023    HDL 75 (H) " "01/12/2023    HDL 83 (H) 10/31/2022     Lab Results   Component Value Date    LDLCALC 57 05/09/2023    LDLCALC 49 01/12/2023    LDLCALC 51 10/31/2022     No results found for: "DLDL"  Lab Results   Component Value Date    TRIG 41 05/09/2023    TRIG 54 01/12/2023    TRIG 60 10/31/2022       f1   Lab Results   Component Value Date    CHOLHDL 2.0 05/09/2023    CHOLHDL 1.8 01/12/2023    CHOLHDL 1.8 10/31/2022     The current medical regimen is effective;  continue present plan and medications.      Orders:  -     Lipid Panel; Future; Expected date: 08/30/2023  -     simvastatin (ZOCOR) 5 MG tablet; Take 1 tablet (5 mg total) by mouth every evening.  Dispense: 90 tablet; Refill: 1    3. Pain and swelling of left wrist  -     Uric Acid    4. IFG (impaired fasting glucose)  -     Hemoglobin A1C; Future; Expected date: 08/30/2023    5. Seasonal allergies  -     cetirizine (ZYRTEC) 10 MG tablet; Take 1 tablet (10 mg total) by mouth once daily.  Dispense: 90 tablet; Refill: 1    Other orders  -     carvediloL (COREG) 3.125 MG tablet; Take 1 tablet (3.125 mg total) by mouth 2 (two) times daily with meals.  Dispense: 180 tablet; Refill: 1  -     megestroL (MEGACE) 40 MG Tab; Take 1 tablet (40 mg total) by mouth once daily.  Dispense: 90 tablet; Refill: 1         There are no Patient Instructions on file for this visit.     Health Maintenance Due   Topic Date Due    Hemoglobin A1c  07/12/2023    Diabetes Urine Screening  08/04/2023         Health Maintenance Topics with due status: Not Due       Topic Last Completion Date    TETANUS VACCINE 04/19/2017    Influenza Vaccine 10/18/2022    Lipid Panel 05/09/2023       Future Appointments   Date Time Provider Department Center   9/26/2023 10:00 AM AWFABRICIO NURSE, [facility] [place]   12/20/2023  9:20 AM Carlotta Snell ACNP [facility] [place]            Signature:  MILLIE King  [facility] " [address] [place] [address]  [phone]    Date of encounter: 8/30/23

## 2023-08-30 NOTE — ASSESSMENT & PLAN NOTE
"Lab Results   Component Value Date    CHOL 129 05/09/2023    CHOL 135 01/12/2023    CHOL 146 10/31/2022     Lab Results   Component Value Date    HDL 64 (H) 05/09/2023    HDL 75 (H) 01/12/2023    HDL 83 (H) 10/31/2022     Lab Results   Component Value Date    LDLCALC 57 05/09/2023    LDLCALC 49 01/12/2023    LDLCALC 51 10/31/2022     No results found for: "DLDL"  Lab Results   Component Value Date    TRIG 41 05/09/2023    TRIG 54 01/12/2023    TRIG 60 10/31/2022       f1   Lab Results   Component Value Date    CHOLHDL 2.0 05/09/2023    CHOLHDL 1.8 01/12/2023    CHOLHDL 1.8 10/31/2022     The current medical regimen is effective;  continue present plan and medications.    "

## 2023-08-31 ENCOUNTER — EXTERNAL CHRONIC CARE MANAGEMENT (OUTPATIENT)
Dept: FAMILY MEDICINE | Facility: CLINIC | Age: 88
End: 2023-08-31
Payer: MEDICARE

## 2023-08-31 PROCEDURE — G0511 PR CHRONIC CARE MGMT, RHC OR FQHC ONLY, 20 MINS OR MORE: ICD-10-PCS | Mod: ,,, | Performed by: NURSE PRACTITIONER

## 2023-08-31 PROCEDURE — G0511 CCM/BHI BY RHC/FQHC 20MIN MO: HCPCS | Mod: ,,, | Performed by: NURSE PRACTITIONER

## 2023-09-26 ENCOUNTER — OFFICE VISIT (OUTPATIENT)
Dept: FAMILY MEDICINE | Facility: CLINIC | Age: 88
End: 2023-09-26
Payer: MEDICARE

## 2023-09-26 VITALS
HEART RATE: 88 BPM | HEIGHT: 60 IN | SYSTOLIC BLOOD PRESSURE: 119 MMHG | DIASTOLIC BLOOD PRESSURE: 63 MMHG | OXYGEN SATURATION: 90 % | BODY MASS INDEX: 17.08 KG/M2 | RESPIRATION RATE: 19 BRPM | WEIGHT: 87 LBS

## 2023-09-26 DIAGNOSIS — Z91.81 HISTORY OF FALLING: ICD-10-CM

## 2023-09-26 DIAGNOSIS — E78.5 HYPERLIPIDEMIA, UNSPECIFIED HYPERLIPIDEMIA TYPE: ICD-10-CM

## 2023-09-26 DIAGNOSIS — E11.9 TYPE 2 DIABETES MELLITUS WITHOUT COMPLICATION, WITHOUT LONG-TERM CURRENT USE OF INSULIN: ICD-10-CM

## 2023-09-26 DIAGNOSIS — I10 ESSENTIAL HYPERTENSION, MALIGNANT: ICD-10-CM

## 2023-09-26 DIAGNOSIS — Z00.00 ENCOUNTER FOR SUBSEQUENT ANNUAL WELLNESS VISIT (AWV) IN MEDICARE PATIENT: Primary | ICD-10-CM

## 2023-09-26 DIAGNOSIS — E46 PROTEIN-CALORIE MALNUTRITION, UNSPECIFIED SEVERITY: ICD-10-CM

## 2023-09-26 DIAGNOSIS — Z74.09 OTHER REDUCED MOBILITY: ICD-10-CM

## 2023-09-26 PROCEDURE — G0008 FLU VACCINE - QUADRIVALENT - ADJUVANTED: ICD-10-PCS | Mod: ,,, | Performed by: NURSE PRACTITIONER

## 2023-09-26 PROCEDURE — 1125F AMNT PAIN NOTED PAIN PRSNT: CPT | Mod: ,,, | Performed by: NURSE PRACTITIONER

## 2023-09-26 PROCEDURE — 1160F RVW MEDS BY RX/DR IN RCRD: CPT | Mod: ,,, | Performed by: NURSE PRACTITIONER

## 2023-09-26 PROCEDURE — G0439 PR MEDICARE ANNUAL WELLNESS SUBSEQUENT VISIT: ICD-10-PCS | Mod: ,,, | Performed by: NURSE PRACTITIONER

## 2023-09-26 PROCEDURE — 1159F PR MEDICATION LIST DOCUMENTED IN MEDICAL RECORD: ICD-10-PCS | Mod: ,,, | Performed by: NURSE PRACTITIONER

## 2023-09-26 PROCEDURE — 1101F PT FALLS ASSESS-DOCD LE1/YR: CPT | Mod: ,,, | Performed by: NURSE PRACTITIONER

## 2023-09-26 PROCEDURE — 90694 FLU VACCINE - QUADRIVALENT - ADJUVANTED: ICD-10-PCS | Mod: ,,, | Performed by: NURSE PRACTITIONER

## 2023-09-26 PROCEDURE — G0439 PPPS, SUBSEQ VISIT: HCPCS | Mod: ,,, | Performed by: NURSE PRACTITIONER

## 2023-09-26 PROCEDURE — 1160F PR REVIEW ALL MEDS BY PRESCRIBER/CLIN PHARMACIST DOCUMENTED: ICD-10-PCS | Mod: ,,, | Performed by: NURSE PRACTITIONER

## 2023-09-26 PROCEDURE — 1159F MED LIST DOCD IN RCRD: CPT | Mod: ,,, | Performed by: NURSE PRACTITIONER

## 2023-09-26 PROCEDURE — 1125F PR PAIN SEVERITY QUANTIFIED, PAIN PRESENT: ICD-10-PCS | Mod: ,,, | Performed by: NURSE PRACTITIONER

## 2023-09-26 PROCEDURE — G0008 ADMIN INFLUENZA VIRUS VAC: HCPCS | Mod: ,,, | Performed by: NURSE PRACTITIONER

## 2023-09-26 PROCEDURE — 3288F PR FALLS RISK ASSESSMENT DOCUMENTED: ICD-10-PCS | Mod: ,,, | Performed by: NURSE PRACTITIONER

## 2023-09-26 PROCEDURE — 1101F PR PT FALLS ASSESS DOC 0-1 FALLS W/OUT INJ PAST YR: ICD-10-PCS | Mod: ,,, | Performed by: NURSE PRACTITIONER

## 2023-09-26 PROCEDURE — 90694 VACC AIIV4 NO PRSRV 0.5ML IM: CPT | Mod: ,,, | Performed by: NURSE PRACTITIONER

## 2023-09-26 PROCEDURE — 3288F FALL RISK ASSESSMENT DOCD: CPT | Mod: ,,, | Performed by: NURSE PRACTITIONER

## 2023-09-26 NOTE — PROGRESS NOTES
"       [facility]       PATIENT NAME: Gisel Shelby   : 1925    AGE: 89 y.o. DATE: 2023   MRN: [id number]        Reason for Visit / Chief Complaint: Medicare AWV Follow Up ([employer] WELLNESS SUBSEQUENT VISIT)        Gisel Shelby presents for an Subsequent Medicare AWV today.     The following components were reviewed and updated:    Medical/Social/Family History:  Past Medical History:   Diagnosis Date    Hypertension         Family History   Problem Relation Age of Onset    Hypertension Mother     Diabetes Sister     Hypertension Sister     Diabetes Brother     Hypertension Brother         Social History     Tobacco Use   Smoking Status Former    Types: Cigarettes    Passive exposure: Never   Smokeless Tobacco Never   Tobacco Comments    Smoked when she"was real young:      Social History     Substance and Sexual Activity   Alcohol Use Never       Family History   Problem Relation Age of Onset    Hypertension Mother     Diabetes Sister     Hypertension Sister     Diabetes Brother     Hypertension Brother        Past Surgical History:   Procedure Laterality Date    APPENDECTOMY           Allergies and Current Medications   Review of patient's allergies indicates:  No Known Allergies    Current Outpatient Medications:     alcohol swabs (DROPSAFE ALCOHOL PREP PADS) PadM, USE 3 SWABS TOPICALLY AS DIRECTED ONCE DAILY, Disp: 300 each, Rfl: 3    aspirin (ECOTRIN) 81 MG EC tablet, Take 81 mg by mouth once daily., Disp: , Rfl:     blood glucose control, low (TRUE METRIX LEVEL 1) Soln, 1 application by NOT APPLICABLE route daily as needed (meter contol)., Disp: 1 each, Rfl: 3    blood-glucose meter (TRUE METRIX AIR GLUCOSE METER) Misc, Inject 1 each into the skin Daily., Disp: 1 each, Rfl: 1    blood-glucose meter (TRUE METRIX GLUCOSE METER) kit, Monitor glucose levels twice daily and prn, Disp: 1 each, Rfl: 3    carvediloL (COREG) 3.125 MG tablet, " Take 1 tablet (3.125 mg total) by mouth 2 (two) times daily with meals., Disp: 180 tablet, Rfl: 1    cetirizine (ZYRTEC) 10 MG tablet, Take 1 tablet (10 mg total) by mouth once daily., Disp: 90 tablet, Rfl: 1    diclofenac sodium (VOLTAREN) 1 % Gel, Apply 2 g topically once daily., Disp: 150 g, Rfl: 1    fluticasone propionate (FLONASE) 50 mcg/actuation nasal spray, 1 spray (50 mcg total) by Each Nostril route once daily., Disp: 16 g, Rfl: 3    hydroCHLOROthiazide (HYDRODIURIL) 25 MG tablet, Take 1 tablet (25 mg total) by mouth once daily., Disp: 90 tablet, Rfl: 1    lisinopriL 10 MG tablet, Take 1 tablet (10 mg total) by mouth once daily., Disp: 90 tablet, Rfl: 1    megestroL (MEGACE) 40 MG Tab, Take 1 tablet (40 mg total) by mouth once daily., Disp: 90 tablet, Rfl: 1    methocarbamoL (ROBAXIN) 500 MG Tab, Take 500 mg by mouth 4 (four) times daily as needed (for neck pain)., Disp: , Rfl:     methylPREDNISolone (MEDROL DOSEPACK) 4 mg tablet, use as directed, Disp: 21 tablet, Rfl: 0    nitroglycerin (NITROMIST) 400 mcg/spray, Place 400 mcg (1 spray total) under the tongue every 5 (five) minutes as needed for Chest pain., Disp: 4.1 g, Rfl: 1    nystatin (MYCOSTATIN) 100,000 unit/mL suspension, TAKE 5ML BY MOUTH FOUR TIMES DAILY FOR 10 DAYS, Disp: 200 mL, Rfl: 0    potassium chloride SA (K-DUR,KLOR-CON M) 10 MEQ tablet, Take 1 tablet (10 mEq total) by mouth once daily., Disp: 90 tablet, Rfl: 1    simvastatin (ZOCOR) 5 MG tablet, Take 1 tablet (5 mg total) by mouth every evening., Disp: 90 tablet, Rfl: 1    traMADoL (ULTRAM) 50 mg tablet, Take 1 tablet (50 mg total) by mouth every 12 (twelve) hours as needed for Pain., Disp: 60 tablet, Rfl: 2    TRUE METRIX GLUCOSE TEST STRIP Strp, TEST BLOOD SUGAR TWICE DAILY AS DIRECTED, Disp: 200 strip, Rfl: 2    TRUEPLUS LANCETS 33 gauge Misc, TEST BLOOD SUGAR TWO TIMES DAILY, Disp: 200 each, Rfl: 2    Health Risk Assessment   Fall Risk: Yes   Obesity: BMI 16.99     Advance  Directive:  Does not have an advanced directive. Verbal education and written education included in today's AVS.    X Patient is interested in learning more about how to make advanced directives.  I provided them paperwork and offered to discuss this with them.   Depression: PHQ9 -3    HTN: 119/63   T2DM: A1C- 5.9   Tobacco use: Denies tobacco use  STI: Not at risk    Alcohol misuse: Denies alcohol use Cage Score: N/A   Statin Use: Continue statin use. Encouraged heart healthy diet & exercised   Mini Co/5      Health Risk Assessment  What is your age?: 80 or older  Are you male or female?: Female  During the past four weeks, how much have you been bothered by emotional problems such as feeling anxious, depressed, irritable, sad, or downhearted and blue?: Not at all  During the past five weeks, has your physical and/or emotional health limited your social activities with family, friends, neighbors, or groups?: Quite a bit  During the past four weeks, how much bodily pain have you generally had?: Moderate pain  During the past four weeks, was someone available to help if you needed and wanted help?: Yes, as much as I wanted  During the past four weeks, what was the hardest physical activity you could do for at least two minutes?: Very light  Can you get to places out of walking distance without help?  (For example, can you travel alone on buses or taxis, or drive your own car?): No  Can you go shopping for groceries or clothes without someone's help?: No  Can you prepare your own meals?: No  Can you do your own housework without help?: No  Because of any health problems, do you need the help of another person with your personal care needs such as eating, bathing, dressing, or getting around the house?: Yes  Can you handle your own money without help?: No  During the past four weeks, how would you rate your health in general?: Good  How have things been going for you during the past four weeks?: Pretty well  Are  you having difficulties driving your car?: Not applicable, I do not use a car  Do you always fasten your seat belt when you are in a car?: Yes, usually  How often in the past four weeks have you been bothered by falling or dizzy when standing up?: Never  How often in the past four weeks have you been bothered by sexual problems?: Never  How often in the past four weeks have you been bothered by trouble eating well?: Often  How often in the past four weeks have you been bothered by teeth or denture problems?: Never  How often in the past four weeks have you been bothered with problems using the telephone?: Never  How often in the past four weeks have you been bothered by tiredness or fatigue?: Never  Have you fallen two or more times in the past year?: No  Are you afraid of falling?: No  Are you a smoker?: No  During the past four weeks, how many drinks of wine, beer, or other alcoholic beverages did you have?: No alcohol at all  Do you exercise for about 20 minutes three or more days a week?: No, I usually do not exercise this much  Have you been given any information to help you with hazards in your house that might hurt you?: Yes  Have you been given any information to help you with keeping track of your medications?: Yes  How often do you have trouble taking medicines the way you've been told to take them?: I always take them as prescribed  How confident are you that you can control and manage most of your health problems?: Somewhat confident  What is your race? (Check all that apply.):     Opioid Risk Assessment:  Opioid risk assessment performed today. Patient is LOW risk for opoid abuse.     Opioid Risk Score         Value Time User    Opioid Risk Score  0 9/22/2022 11:47 AM Suzanne Coronado, RN                 Health Maintenance   Last eye exam: 08/23/2022- [facility]   Last CV screen with lipids: 08/30/2023   Diabetes screening with fasting glucose or A1c:  08/30/2023   Colonoscopy: Advanced age   Flu Vaccine: Given today   Pneumonia vaccines: 04/14/2016;  10/30/2017   COVID vaccine: 02/03/2021;  02/26/2021; 11/01/2021; 05/11/2022   Hep B vaccine: Not at risk   DEXA: Advanced age   Last pap/pelvic: Advanced age   Last Mammogram: Advanced age   Last PSA screen: N/A   AAA screening: N/A (once in lifetime for males 65-75 who have smoked > 100 cigarettes in lifetime)  HIV Screeing: N/A  Hepatitis C Screen: N/A  Low Dose CT Scan: N/A    Health Maintenance Topics with due status: Not Due       Topic Last Completion Date    TETANUS VACCINE 04/19/2017    Diabetes Urine Screening 08/30/2023    Lipid Panel 08/30/2023    Hemoglobin A1c 08/30/2023     Health Maintenance Due   Topic Date Due    Eye Exam  08/23/2023    Influenza Vaccine (1) 09/01/2023       Incontinence  Bowel: No  Bladder: No    Lab results available in Epic or see dates from [facility] above:   Lab Results   Component Value Date    CHOL 113 08/30/2023    CHOL 129 05/09/2023    CHOL 135 01/12/2023     Lab Results   Component Value Date    HDL 65 (H) 08/30/2023    HDL 64 (H) 05/09/2023    HDL 75 (H) 01/12/2023     Lab Results   Component Value Date    LDLCALC 39 08/30/2023    LDLCALC 57 05/09/2023    LDLCALC 49 01/12/2023     Lab Results   Component Value Date    TRIG 43 08/30/2023    TRIG 41 05/09/2023    TRIG 54 01/12/2023     Lab Results   Component Value Date    CHOLHDL 1.7 08/30/2023    CHOLHDL 2.0 05/09/2023    CHOLHDL 1.8 01/12/2023       Lab Results   Component Value Date    HGBA1C 5.9 08/30/2023       Sodium   Date Value Ref Range Status   08/30/2023 136 136 - 145 mmol/L Final     Potassium   Date Value Ref Range Status   08/30/2023 3.9 3.5 - 5.1 mmol/L Final     Chloride   Date Value Ref Range Status   08/30/2023 104 98 - 107 mmol/L Final     CO2   Date Value Ref Range Status   08/30/2023 24 21 - 32 mmol/L Final     Glucose   Date Value Ref Range Status   08/30/2023 74 74 - 106 mg/dL Final     BUN   Date Value  Ref Range Status   08/30/2023 16 7 - 18 mg/dL Final     Creatinine   Date Value Ref Range Status   08/30/2023 0.76 0.55 - 1.02 mg/dL Final     Calcium   Date Value Ref Range Status   08/30/2023 9.0 8.5 - 10.1 mg/dL Final     Total Protein   Date Value Ref Range Status   08/30/2023 7.5 6.4 - 8.2 g/dL Final     Albumin   Date Value Ref Range Status   08/30/2023 2.6 (L) 3.5 - 5.0 g/dL Final     Bilirubin, Total   Date Value Ref Range Status   08/30/2023 0.4 >0.0 - 1.2 mg/dL Final     Alk Phos   Date Value Ref Range Status   08/30/2023 66 55 - 142 U/L Final     AST   Date Value Ref Range Status   08/30/2023 17 15 - 37 U/L Final     ALT   Date Value Ref Range Status   08/30/2023 26 13 - 56 U/L Final     Anion Gap   Date Value Ref Range Status   08/30/2023 12 7 - 16 mmol/L Final     eGFR    Date Value Ref Range Status   11/18/2021 84 >=60 mL/min/1.73m² Final     eGFR   Date Value Ref Range Status   08/04/2022 82 >=60 mL/min/1.73m² Final           Care Team   PCP: MILLIE Rogers           **See Completed Assessments for Annual Wellness visit within the encounter summary    The following assessments were completed & reviewed:  Depression Screening  Cognitive function Screening  Timed Get Up Test  Whisper Test  Vision Screen  Health Risk Assessment  Checklist of ADLs and IADLs      Objective  Vitals:    09/26/23 1030   BP: 119/63   Pulse: 88   Resp: 19   SpO2: (!) 90%   Weight: 39.5 kg (87 lb)   Height: 5' (1.524 m)   PainSc:   3   PainLoc: Knee      Body mass index is 16.99 kg/m².  Ideal body weight: 50.4 kg (111 lb 3.2 oz)       Physical Exam  Constitutional:       General: She is not in acute distress.     Appearance: Normal appearance.   HENT:      Head: Normocephalic.   Eyes:      Pupils: Pupils are equal, round, and reactive to light.   Cardiovascular:      Rate and Rhythm: Normal rate and regular rhythm.      Heart sounds: No murmur heard.  Pulmonary:      Effort: Pulmonary effort is normal.       Breath sounds: Normal breath sounds.   Abdominal:      General: Bowel sounds are normal. There is no distension.      Hernia: No hernia is present.   Musculoskeletal:         General: No swelling or tenderness.      Right lower leg: No edema.      Left lower leg: No edema.   Skin:     General: Skin is warm and dry.   Neurological:      General: No focal deficit present.      Mental Status: She is alert and oriented to person, place, and time.           Assessment:     1. Encounter for subsequent annual wellness visit (AWV) in Medicare patient    2. Essential hypertension, malignant    3. Hyperlipidemia, unspecified hyperlipidemia type    4. Type 2 diabetes mellitus without complication, without long-term current use of insulin    5. Protein-calorie malnutrition, unspecified severity    6. BMI less than 19,adult    7. Other reduced mobility    8. History of falling         Plan:    Referrals/Orders:  None     Advised to call office if does not hear from anyone with referral appt within 2-3 weeks to check on status of referral. Voiced understanding.    Keep current on appts & continue medications as ordered. Prevent falls by wearing good non-skid shoes & continue use of assistive devices.      Discussed and provided with a screening schedule and personal prevention plan in accordance with USPSTF age appropriate recommendations and Medicare screening guidelines.   Education, counseling, and referrals were provided as needed.  After Visit Summary printed and given to patient which includes written education and a list of any referrals if indicated. All education discussed with patient & handouts given to patient.      F/u plan for yearly AWV.    Signature: MILLIE Rogers

## 2023-09-26 NOTE — PATIENT INSTRUCTIONS
Counseling and Referral of Other Preventative  (Italic type indicates deductible and co-insurance are waived)    Patient Name: Gisel Shelby  Today's Date: 9/26/2023    Health Maintenance       Date Due Completion Date    Eye Exam 08/23/2023 8/23/2022    Influenza Vaccine (1) 09/01/2023 10/18/2022    Hemoglobin A1c 02/29/2024 8/30/2023    Diabetes Urine Screening 08/30/2024 8/30/2023    Lipid Panel 08/30/2024 8/30/2023    TETANUS VACCINE 04/19/2027 4/19/2017        Orders Placed This Encounter   Procedures    Influenza - Quadrivalent (Adjuvanted)     The following information is provided to all patients.  This information is to help you find resources for any of the problems found today that may be affecting your health:                Living healthy guide: www.[facility].[place].[facility]      Understanding Diabetes: www.diabetes.org      Eating healthy: www.cdc.gov/healthyweight      CDC home safety checklist: www.cdc.gov/steadi/patient.html      Agency on Aging: www.goea.[place].[facility]      Alcoholics anonymous (AA): www.aa.org      Physical Activity: www.neptali.nih.gov/vp2zchn      Tobacco use: www.quitwithusla.org

## 2023-09-30 ENCOUNTER — EXTERNAL CHRONIC CARE MANAGEMENT (OUTPATIENT)
Dept: FAMILY MEDICINE | Facility: CLINIC | Age: 88
End: 2023-09-30
Payer: MEDICARE

## 2023-09-30 PROCEDURE — G0511 CCM/BHI BY RHC/FQHC 20MIN MO: HCPCS | Mod: ,,, | Performed by: NURSE PRACTITIONER

## 2023-09-30 PROCEDURE — G0511 PR CHRONIC CARE MGMT, RHC OR FQHC ONLY, 20 MINS OR MORE: ICD-10-PCS | Mod: ,,, | Performed by: NURSE PRACTITIONER

## 2023-10-23 RX ORDER — TRAMADOL HYDROCHLORIDE 50 MG/1
50 TABLET ORAL EVERY 12 HOURS PRN
Qty: 60 TABLET | Refills: 1 | Status: SHIPPED | OUTPATIENT
Start: 2023-10-23

## 2023-10-23 RX ORDER — ISOPROPYL ALCOHOL 70 ML/100ML
1 SWAB TOPICAL DAILY
Qty: 400 EACH | Refills: 3 | Status: SHIPPED | OUTPATIENT
Start: 2023-10-23

## 2023-10-23 RX ORDER — BLOOD-GLUCOSE METER
EACH MISCELLANEOUS
Qty: 1 EACH | Refills: 10 | Status: SHIPPED | OUTPATIENT
Start: 2023-10-23

## 2023-10-23 RX ORDER — NYSTATIN 100000 [USP'U]/ML
SUSPENSION ORAL
Qty: 200 ML | Refills: 10 | Status: SHIPPED | OUTPATIENT
Start: 2023-10-23

## 2023-10-31 ENCOUNTER — EXTERNAL CHRONIC CARE MANAGEMENT (OUTPATIENT)
Dept: FAMILY MEDICINE | Facility: CLINIC | Age: 88
End: 2023-10-31
Payer: MEDICARE

## 2023-10-31 PROCEDURE — G0511 PR CHRONIC CARE MGMT, RHC OR FQHC ONLY, 20 MINS OR MORE: ICD-10-PCS | Mod: ,,, | Performed by: NURSE PRACTITIONER

## 2023-10-31 PROCEDURE — G0511 CCM/BHI BY RHC/FQHC 20MIN MO: HCPCS | Mod: ,,, | Performed by: NURSE PRACTITIONER

## 2023-11-30 ENCOUNTER — EXTERNAL CHRONIC CARE MANAGEMENT (OUTPATIENT)
Dept: FAMILY MEDICINE | Facility: CLINIC | Age: 88
End: 2023-11-30
Payer: MEDICARE

## 2023-11-30 PROCEDURE — G0511 PR CHRONIC CARE MGMT, RHC OR FQHC ONLY, 20 MINS OR MORE: ICD-10-PCS | Mod: ,,, | Performed by: NURSE PRACTITIONER

## 2023-11-30 PROCEDURE — G0511 CCM/BHI BY RHC/FQHC 20MIN MO: HCPCS | Mod: ,,, | Performed by: NURSE PRACTITIONER

## 2023-12-11 ENCOUNTER — TELEPHONE (OUTPATIENT)
Dept: FAMILY MEDICINE | Facility: CLINIC | Age: 88
End: 2023-12-11
Payer: MEDICARE

## 2023-12-11 NOTE — TELEPHONE ENCOUNTER
----- Message from Gloria Stewart sent at 12/11/2023 11:44 AM CST -----  Pt's son calling to speak with nurse regarding questions he has about his mom - states pt is on megestrol for her appetite but pt seems to think it is not working. Also has questions about getting home health set up and referral for a dietician - Call back # [phone] (jesus)

## 2023-12-11 NOTE — TELEPHONE ENCOUNTER
Spoke with pts son. Pt does want HH assistance if possible. He has no preference. Just who Ms Snell chooses.

## 2023-12-11 NOTE — TELEPHONE ENCOUNTER
Spoke with pts son via phone. The  family is concerned about pts poor appetite and weight loss. She is on megace to stimulate her appetite but does not seem to help. Pt is also on supplemental boost but again does not seem to help.     They are requesting HH with dietician consult and med mgt. Pt would benefit from HH to assist with pt and family education. Also to help monitor vital signs and pt education. Also recommend PT evaluation and treatment due to risk of falls and weakness.

## 2023-12-11 NOTE — TELEPHONE ENCOUNTER
----- Message from MILLIE King sent at 12/11/2023  2:25 PM CST -----  We can increase the megace if needed. However this is likely some related to her advanced age. I will be glad to refer to  for assistance. What agency do they want to use do they have a preference:?   ----- Message -----  From: Mel Loomis, RN  Sent: 12/11/2023  11:58 AM CST  To: MILLIE King    advise  ----- Message -----  From: Gloria Stewart  Sent: 12/11/2023  11:48 AM CST  To: Channing RIDLEY Staff    Pt's son calling to speak with nurse regarding questions he has about his mom - states pt is on megestrol for her appetite but pt seems to think it is not working. Also has questions about getting home health set up and referral for a dietician - Call back # [phone] (jesus)

## 2023-12-20 ENCOUNTER — OFFICE VISIT (OUTPATIENT)
Dept: FAMILY MEDICINE | Facility: CLINIC | Age: 88
End: 2023-12-20
Payer: MEDICARE

## 2023-12-20 VITALS
BODY MASS INDEX: 15.9 KG/M2 | TEMPERATURE: 99 F | OXYGEN SATURATION: 97 % | HEART RATE: 69 BPM | HEIGHT: 60 IN | DIASTOLIC BLOOD PRESSURE: 75 MMHG | RESPIRATION RATE: 16 BRPM | SYSTOLIC BLOOD PRESSURE: 139 MMHG | WEIGHT: 81 LBS

## 2023-12-20 DIAGNOSIS — I10 ESSENTIAL HYPERTENSION, MALIGNANT: Primary | ICD-10-CM

## 2023-12-20 DIAGNOSIS — E78.5 HYPERLIPIDEMIA, UNSPECIFIED HYPERLIPIDEMIA TYPE: ICD-10-CM

## 2023-12-20 DIAGNOSIS — E46 PROTEIN-CALORIE MALNUTRITION, UNSPECIFIED SEVERITY: ICD-10-CM

## 2023-12-20 DIAGNOSIS — E11.9 TYPE 2 DIABETES MELLITUS WITHOUT COMPLICATION, WITHOUT LONG-TERM CURRENT USE OF INSULIN: ICD-10-CM

## 2023-12-20 LAB
ALBUMIN SERPL BCP-MCNC: 2.4 G/DL (ref 3.5–5)
ALBUMIN/GLOB SERPL: 0.5 {RATIO}
ALP SERPL-CCNC: 79 U/L (ref 55–142)
ALT SERPL W P-5'-P-CCNC: 31 U/L (ref 13–56)
ANION GAP SERPL CALCULATED.3IONS-SCNC: 12 MMOL/L (ref 7–16)
ANISOCYTOSIS BLD QL SMEAR: ABNORMAL
AST SERPL W P-5'-P-CCNC: 17 U/L (ref 15–37)
BASOPHILS # BLD AUTO: 0.08 K/UL (ref 0–0.2)
BASOPHILS NFR BLD AUTO: 0.8 % (ref 0–1)
BILIRUB SERPL-MCNC: 0.5 MG/DL (ref ?–1.2)
BUN SERPL-MCNC: 18 MG/DL (ref 7–18)
BUN/CREAT SERPL: 27 (ref 6–20)
CALCIUM SERPL-MCNC: 9.2 MG/DL (ref 8.5–10.1)
CHLORIDE SERPL-SCNC: 107 MMOL/L (ref 98–107)
CHOLEST SERPL-MCNC: 145 MG/DL (ref 0–200)
CHOLEST/HDLC SERPL: 2.1 {RATIO}
CO2 SERPL-SCNC: 23 MMOL/L (ref 21–32)
CREAT SERPL-MCNC: 0.67 MG/DL (ref 0.55–1.02)
CRENATED CELLS: ABNORMAL
DIFFERENTIAL METHOD BLD: ABNORMAL
EGFR (NO RACE VARIABLE) (RUSH/TITUS): 79 ML/MIN/1.73M2
EOSINOPHIL # BLD AUTO: 0.74 K/UL (ref 0–0.5)
EOSINOPHIL NFR BLD AUTO: 7.1 % (ref 1–4)
ERYTHROCYTE [DISTWIDTH] IN BLOOD BY AUTOMATED COUNT: 23.8 % (ref 11.5–14.5)
GLOBULIN SER-MCNC: 4.9 G/DL (ref 2–4)
GLUCOSE SERPL-MCNC: 73 MG/DL (ref 74–106)
HCT VFR BLD AUTO: 27.8 % (ref 38–47)
HDLC SERPL-MCNC: 69 MG/DL (ref 40–60)
HGB BLD-MCNC: 8.3 G/DL (ref 12–16)
IMM GRANULOCYTES # BLD AUTO: 0.04 K/UL (ref 0–0.04)
IMM GRANULOCYTES NFR BLD: 0.4 % (ref 0–0.4)
LDLC SERPL CALC-MCNC: 68 MG/DL
LDLC/HDLC SERPL: 1 {RATIO}
LYMPHOCYTES # BLD AUTO: 2.33 K/UL (ref 1–4.8)
LYMPHOCYTES NFR BLD AUTO: 22.3 % (ref 27–41)
MCH RBC QN AUTO: 25.4 PG (ref 27–31)
MCHC RBC AUTO-ENTMCNC: 29.9 G/DL (ref 32–36)
MCV RBC AUTO: 85 FL (ref 80–96)
MONOCYTES # BLD AUTO: 0.62 K/UL (ref 0–0.8)
MONOCYTES NFR BLD AUTO: 5.9 % (ref 2–6)
MPC BLD CALC-MCNC: 9.1 FL (ref 9.4–12.4)
NEUTROPHILS # BLD AUTO: 6.63 K/UL (ref 1.8–7.7)
NEUTROPHILS NFR BLD AUTO: 63.5 % (ref 53–65)
NONHDLC SERPL-MCNC: 76 MG/DL
NRBC # BLD AUTO: 0 X10E3/UL
NRBC, AUTO (.00): 0 %
OVALOCYTES BLD QL SMEAR: ABNORMAL
PLATELET # BLD AUTO: 520 K/UL (ref 150–400)
PLATELET MORPHOLOGY: ABNORMAL
POTASSIUM SERPL-SCNC: 4.1 MMOL/L (ref 3.5–5.1)
PROT SERPL-MCNC: 7.3 G/DL (ref 6.4–8.2)
RBC # BLD AUTO: 3.27 M/UL (ref 4.2–5.4)
SODIUM SERPL-SCNC: 138 MMOL/L (ref 136–145)
TARGETS BLD QL SMEAR: ABNORMAL
TRIGL SERPL-MCNC: 42 MG/DL (ref 35–150)
VLDLC SERPL-MCNC: 8 MG/DL
WBC # BLD AUTO: 10.44 K/UL (ref 4.5–11)

## 2023-12-20 PROCEDURE — 1159F PR MEDICATION LIST DOCUMENTED IN MEDICAL RECORD: ICD-10-PCS | Mod: ,,, | Performed by: NURSE PRACTITIONER

## 2023-12-20 PROCEDURE — 80061 LIPID PANEL: CPT | Mod: ,,, | Performed by: CLINICAL MEDICAL LABORATORY

## 2023-12-20 PROCEDURE — 99213 PR OFFICE/OUTPT VISIT, EST, LEVL III, 20-29 MIN: ICD-10-PCS | Mod: ,,, | Performed by: NURSE PRACTITIONER

## 2023-12-20 PROCEDURE — 80061 LIPID PANEL: ICD-10-PCS | Mod: ,,, | Performed by: CLINICAL MEDICAL LABORATORY

## 2023-12-20 PROCEDURE — 1160F RVW MEDS BY RX/DR IN RCRD: CPT | Mod: ,,, | Performed by: NURSE PRACTITIONER

## 2023-12-20 PROCEDURE — 80053 COMPREHEN METABOLIC PANEL: CPT | Mod: ,,, | Performed by: CLINICAL MEDICAL LABORATORY

## 2023-12-20 PROCEDURE — 85025 CBC WITH DIFFERENTIAL: ICD-10-PCS | Mod: ,,, | Performed by: CLINICAL MEDICAL LABORATORY

## 2023-12-20 PROCEDURE — 85025 COMPLETE CBC W/AUTO DIFF WBC: CPT | Mod: ,,, | Performed by: CLINICAL MEDICAL LABORATORY

## 2023-12-20 PROCEDURE — 1159F MED LIST DOCD IN RCRD: CPT | Mod: ,,, | Performed by: NURSE PRACTITIONER

## 2023-12-20 PROCEDURE — 99213 OFFICE O/P EST LOW 20 MIN: CPT | Mod: ,,, | Performed by: NURSE PRACTITIONER

## 2023-12-20 PROCEDURE — 80053 COMPREHENSIVE METABOLIC PANEL: ICD-10-PCS | Mod: ,,, | Performed by: CLINICAL MEDICAL LABORATORY

## 2023-12-20 PROCEDURE — 1160F PR REVIEW ALL MEDS BY PRESCRIBER/CLIN PHARMACIST DOCUMENTED: ICD-10-PCS | Mod: ,,, | Performed by: NURSE PRACTITIONER

## 2023-12-20 RX ORDER — NITROGLYCERIN 0.4 MG/1
0.4 TABLET SUBLINGUAL EVERY 5 MIN PRN
COMMUNITY
Start: 2023-07-11

## 2023-12-20 NOTE — PROGRESS NOTES
MILLIE King   [facility] [address] [place] [address]  [phone]      PATIENT NAME: Gisel Shelby  : 1925  DATE: 23  MRN: [id number]      Billing Provider: MILLIE King  Level of Service:   Patient PCP Information       Provider PCP Type    MILLIE King General            Reason for Visit / Chief Complaint: Hypertension, Hyperlipidemia, and Follow-up       Update PCP  Update Chief Complaint         History of Present Illness / Problem Focused Workflow     Gisel Shelby presents to the clinic with Hypertension, Hyperlipidemia, and Follow-up     Pt presents for routine follow up with lab and med refills. Overall doing well.      BP appears  controlled today. Home meds reviewed  The current medical regimen is effective;  continue present plan and medications. Recommended patient to check home readings to monitor and see me for followup as scheduled or sooner as needed.   Discussed sodium restriction, maintaining ideal body weight and regular exercise program as physiologic means to continue to achieve blood pressure control in addition to medication compliance.  Patient was educated that both decongestant and anti-inflammatory medication may raise blood pressure.  Advised to monitor BP at home. Advised on optimal BP readings - SBP < 130 & DBP < 80. Advised to call office for any persistent BP elevation and may have to prescribe or adjust BP med(s).  Recommended DASH diet, stay well hydrated with water daily, eliminate or decrease caffeinated and high calorie drinks, increase physical activity, and lose weight if BMI > 25.0.    Discussed need for low fat/low cholesterol diet, regular exercise, and weight control.   Cardiovascular risk and specific lipid/LDL goals reviewed.    Patient continues to weight loss and reports poor appetite and poor p.o. intake has been using  supplemental ensure/boost.  Recommended that they continue to do so.  Encouraged the patient was eating 3 large meals a day to eat multiple small meals a day.  Patient verbalized understanding and agreement.      Patient is up and down frequently throughout the night to use the restroom due to her advanced age and the risk of falls she would benefit from a bedside commode.  She does use a cane and walker to assist with ambulation to decrease risk of falls as well bedside commode would be extremely beneficial.        Review of Systems     Review of Systems   Constitutional:  Positive for appetite change. Negative for fatigue and fever.   HENT:  Negative for nasal congestion and sore throat.    Eyes:  Negative for visual disturbance.   Respiratory:  Negative for chest tightness and shortness of breath.    Cardiovascular:  Negative for chest pain and leg swelling.   Gastrointestinal:  Negative for abdominal pain and change in bowel habit.   Endocrine: Negative for polydipsia, polyphagia and polyuria.   Genitourinary:  Negative for dysuria and hematuria.   Musculoskeletal:  Negative for back pain and leg pain.   Integumentary:  Negative for rash.   Neurological:  Negative for dizziness, syncope, weakness and light-headedness.        Medical / Social / Family History     Past Medical History:   Diagnosis Date    Hypertension        Past Surgical History:   Procedure Laterality Date    APPENDECTOMY         Social History  Ms. Shelby  reports that she has quit smoking. Her smoking use included cigarettes. She has never been exposed to tobacco smoke. She has never used smokeless tobacco. She reports that she does not drink alcohol and does not use drugs.    Family History  Ms. Shelby's family history includes Diabetes in her brother and sister; Hypertension in her brother, mother, and sister.    Medications and Allergies     Medications  Outpatient Medications Marked as Taking for the 12/20/23 encounter (Office Visit)  with Caroltta Snell ACNP   Medication Sig Dispense Refill    alcohol swabs (BD ALCOHOL SWABS) PadM Apply 1 each topically once daily. 400 each 3    aspirin (ECOTRIN) 81 MG EC tablet Take 81 mg by mouth once daily.      blood glucose control, low (TRUE METRIX LEVEL 1) Soln 1 application by NOT APPLICABLE route daily as needed (meter contol). 1 each 3    blood-glucose meter (TRUE METRIX AIR GLUCOSE METER) kit USE AS DIRECTED 1 each 10    blood-glucose meter (TRUE METRIX GLUCOSE METER) kit Monitor glucose levels twice daily and prn 1 each 3    carvediloL (COREG) 3.125 MG tablet Take 1 tablet (3.125 mg total) by mouth 2 (two) times daily with meals. 180 tablet 1    cetirizine (ZYRTEC) 10 MG tablet Take 1 tablet (10 mg total) by mouth once daily. 90 tablet 1    diclofenac sodium (VOLTAREN) 1 % Gel Apply 2 g topically once daily. 150 g 1    fluticasone propionate (FLONASE) 50 mcg/actuation nasal spray 1 spray (50 mcg total) by Each Nostril route once daily. 16 g 3    hydroCHLOROthiazide (HYDRODIURIL) 25 MG tablet Take 1 tablet (25 mg total) by mouth once daily. 90 tablet 1    lisinopriL 10 MG tablet Take 1 tablet (10 mg total) by mouth once daily. 90 tablet 1    megestroL (MEGACE) 40 MG Tab Take 1 tablet (40 mg total) by mouth once daily. 90 tablet 1    methocarbamoL (ROBAXIN) 500 MG Tab Take 500 mg by mouth 4 (four) times daily as needed (for neck pain).      nitroGLYCERIN (NITROSTAT) 0.4 MG SL tablet Place 0.4 mg under the tongue every 5 (five) minutes as needed.      nystatin (MYCOSTATIN) 100,000 unit/mL suspension TAKE 5ML FOUR TIMES DAILY FOR 10 DAYS 200 mL 10    potassium chloride SA (K-DUR,KLOR-CON M) 10 MEQ tablet Take 1 tablet (10 mEq total) by mouth once daily. 90 tablet 1    simvastatin (ZOCOR) 5 MG tablet Take 1 tablet (5 mg total) by mouth every evening. 90 tablet 1    traMADoL (ULTRAM) 50 mg tablet TAKE 1 TABLET EVERY 12 HOURS AS NEEDED FOR PAIN 60 tablet 1    TRUE METRIX GLUCOSE TEST STRIP Strp TEST  BLOOD SUGAR TWICE DAILY AS DIRECTED 200 strip 2    TRUEPLUS LANCETS 33 gauge Misc TEST BLOOD SUGAR TWO TIMES DAILY 200 each 2    [DISCONTINUED] nitroglycerin (NITROMIST) 400 mcg/spray Place 400 mcg (1 spray total) under the tongue every 5 (five) minutes as needed for Chest pain. 4.1 g 1       Allergies  Review of patient's allergies indicates:  No Known Allergies    Physical Examination     Vitals:    12/20/23 0948   BP: 139/75   Pulse: 69   Resp: 16   Temp: 98.5 °F (36.9 °C)     Physical Exam  Eyes:      Pupils: Pupils are equal, round, and reactive to light.   Cardiovascular:      Rate and Rhythm: Normal rate and regular rhythm.      Heart sounds: Normal heart sounds. No murmur heard.  Pulmonary:      Breath sounds: Normal breath sounds. No wheezing, rhonchi or rales.   Abdominal:      General: Bowel sounds are normal.   Musculoskeletal:         General: No swelling.      Cervical back: Normal range of motion and neck supple.   Skin:     General: Skin is warm and dry.   Neurological:      Mental Status: She is alert and oriented to person, place, and time.          Lab Results   Component Value Date    WBC 10.58 11/18/2021    HGB 11.8 (L) 11/18/2021    HCT 38.8 11/18/2021    MCV 95.8 11/18/2021     11/18/2021        Sodium   Date Value Ref Range Status   08/30/2023 136 136 - 145 mmol/L Final     Potassium   Date Value Ref Range Status   08/30/2023 3.9 3.5 - 5.1 mmol/L Final     Chloride   Date Value Ref Range Status   08/30/2023 104 98 - 107 mmol/L Final     CO2   Date Value Ref Range Status   08/30/2023 24 21 - 32 mmol/L Final     Glucose   Date Value Ref Range Status   08/30/2023 74 74 - 106 mg/dL Final     BUN   Date Value Ref Range Status   08/30/2023 16 7 - 18 mg/dL Final     Creatinine   Date Value Ref Range Status   08/30/2023 0.76 0.55 - 1.02 mg/dL Final     Calcium   Date Value Ref Range Status   08/30/2023 9.0 8.5 - 10.1 mg/dL Final     Total Protein   Date Value Ref Range Status   08/30/2023 7.5  "6.4 - 8.2 g/dL Final     Albumin   Date Value Ref Range Status   08/30/2023 2.6 (L) 3.5 - 5.0 g/dL Final     Bilirubin, Total   Date Value Ref Range Status   08/30/2023 0.4 >0.0 - 1.2 mg/dL Final     Alk Phos   Date Value Ref Range Status   08/30/2023 66 55 - 142 U/L Final     AST   Date Value Ref Range Status   08/30/2023 17 15 - 37 U/L Final     ALT   Date Value Ref Range Status   08/30/2023 26 13 - 56 U/L Final     Anion Gap   Date Value Ref Range Status   08/30/2023 12 7 - 16 mmol/L Final     eGFR   Date Value Ref Range Status   08/30/2023 71 >=60 mL/min/1.73m2 Final      Lab Results   Component Value Date    HGBA1C 5.9 08/30/2023      Lab Results   Component Value Date    CHOL 113 08/30/2023    CHOL 129 05/09/2023    CHOL 135 01/12/2023     Lab Results   Component Value Date    HDL 65 (H) 08/30/2023    HDL 64 (H) 05/09/2023    HDL 75 (H) 01/12/2023     Lab Results   Component Value Date    LDLCALC 39 08/30/2023    LDLCALC 57 05/09/2023    LDLCALC 49 01/12/2023     No results found for: "DLDL"  Lab Results   Component Value Date    TRIG 43 08/30/2023    TRIG 41 05/09/2023    TRIG 54 01/12/2023     Lab Results   Component Value Date    CHOLHDL 1.7 08/30/2023    CHOLHDL 2.0 05/09/2023    CHOLHDL 1.8 01/12/2023      No results found for: "TSH", "W0WGBCY", "F5KUXRX", "THYROIDAB", "FREET4"     Assessment and Plan (including Health Maintenance)      Problem List  Smart Sets  Document Outside HM   :    Plan:     1. Essential hypertension, malignant  Overview:  review and cont home meds  bp appears well controlled     Assessment & Plan:  BP Readings from Last 3 Encounters:   12/20/23 139/75   09/26/23 119/63   08/30/23 (!) 144/70    The current medical regimen is effective;  continue present plan and medications.      Orders:  -     CBC Auto Differential; Future; Expected date: 12/20/2023  -     Comprehensive Metabolic Panel; Future; Expected date: 12/20/2023    2. Hyperlipidemia, unspecified hyperlipidemia " type  Assessment & Plan:  Lab Results   Component Value Date    CHOL 113 08/30/2023    CHOL 129 05/09/2023    CHOL 135 01/12/2023     Lab Results   Component Value Date    HDL 65 (H) 08/30/2023    HDL 64 (H) 05/09/2023    HDL 75 (H) 01/12/2023     Lab Results   Component Value Date    LDLCALC 39 08/30/2023    LDLCALC 57 05/09/2023    LDLCALC 49 01/12/2023     Lab Results   Component Value Date    TRIG 43 08/30/2023    TRIG 41 05/09/2023    TRIG 54 01/12/2023       Lab Results   Component Value Date    CHOLHDL 1.7 08/30/2023    CHOLHDL 2.0 05/09/2023    CHOLHDL 1.8 01/12/2023    The current medical regimen is effective;  continue present plan and medications.      Orders:  -     Lipid Panel; Future; Expected date: 12/20/2023    3. Protein-calorie malnutrition, unspecified severity  Assessment & Plan:  Encouraged several small meals daily  Encouraged cont use of boost/ensure bid       4. Type 2 diabetes mellitus without complication, without long-term current use of insulin  Assessment & Plan:  Diet controlled   On no meds            There are no Patient Instructions on file for this visit.     There are no preventive care reminders to display for this patient.      Health Maintenance Topics with due status: Not Due       Topic Last Completion Date    TETANUS VACCINE 04/19/2017    Diabetes Urine Screening 08/30/2023    Lipid Panel 08/30/2023    Hemoglobin A1c 08/30/2023       Future Appointments   Date Time Provider Department Center   4/22/2024  9:40 AM Carlotta Snell ACNP [facility] [place]   10/1/2024 11:00 AM AWFABRICIO NURSE, [facility] [place]            Signature:  MILLIE King  [facility] [address] [place] [address]  [phone]    Date of encounter: 12/20/23

## 2023-12-20 NOTE — ASSESSMENT & PLAN NOTE
BP Readings from Last 3 Encounters:   12/20/23 139/75   09/26/23 119/63   08/30/23 (!) 144/70    The current medical regimen is effective;  continue present plan and medications.

## 2023-12-20 NOTE — ASSESSMENT & PLAN NOTE
Lab Results   Component Value Date    CHOL 113 08/30/2023    CHOL 129 05/09/2023    CHOL 135 01/12/2023     Lab Results   Component Value Date    HDL 65 (H) 08/30/2023    HDL 64 (H) 05/09/2023    HDL 75 (H) 01/12/2023     Lab Results   Component Value Date    LDLCALC 39 08/30/2023    LDLCALC 57 05/09/2023    LDLCALC 49 01/12/2023     Lab Results   Component Value Date    TRIG 43 08/30/2023    TRIG 41 05/09/2023    TRIG 54 01/12/2023       Lab Results   Component Value Date    CHOLHDL 1.7 08/30/2023    CHOLHDL 2.0 05/09/2023    CHOLHDL 1.8 01/12/2023    The current medical regimen is effective;  continue present plan and medications.

## 2023-12-21 ENCOUNTER — EXTERNAL HOME HEALTH (OUTPATIENT)
Dept: HOME HEALTH SERVICES | Facility: HOSPITAL | Age: 88
End: 2023-12-21
Payer: MEDICARE

## 2023-12-31 ENCOUNTER — EXTERNAL CHRONIC CARE MANAGEMENT (OUTPATIENT)
Dept: FAMILY MEDICINE | Facility: CLINIC | Age: 88
End: 2023-12-31
Payer: MEDICARE

## 2023-12-31 PROCEDURE — G0511 CCM/BHI BY RHC/FQHC 20MIN MO: HCPCS | Mod: ,,, | Performed by: NURSE PRACTITIONER

## 2024-01-24 DIAGNOSIS — I10 ESSENTIAL HYPERTENSION, MALIGNANT: ICD-10-CM

## 2024-01-24 DIAGNOSIS — E78.5 HYPERLIPIDEMIA, UNSPECIFIED HYPERLIPIDEMIA TYPE: ICD-10-CM

## 2024-01-24 RX ORDER — LISINOPRIL 10 MG/1
10 TABLET ORAL
Qty: 90 TABLET | Refills: 3 | Status: SHIPPED | OUTPATIENT
Start: 2024-01-24

## 2024-01-24 RX ORDER — SIMVASTATIN 5 MG/1
5 TABLET, FILM COATED ORAL NIGHTLY
Qty: 90 TABLET | Refills: 3 | Status: SHIPPED | OUTPATIENT
Start: 2024-01-24

## 2024-01-29 RX ORDER — CARVEDILOL 3.12 MG/1
3.12 TABLET ORAL 2 TIMES DAILY WITH MEALS
Qty: 180 TABLET | Refills: 1 | Status: SHIPPED | OUTPATIENT
Start: 2024-01-29

## 2024-01-31 ENCOUNTER — EXTERNAL CHRONIC CARE MANAGEMENT (OUTPATIENT)
Dept: FAMILY MEDICINE | Facility: CLINIC | Age: 89
End: 2024-01-31
Payer: MEDICARE

## 2024-01-31 PROCEDURE — G0511 CCM/BHI BY RHC/FQHC 20MIN MO: HCPCS | Mod: GW,,, | Performed by: NURSE PRACTITIONER

## 2024-02-29 ENCOUNTER — EXTERNAL CHRONIC CARE MANAGEMENT (OUTPATIENT)
Dept: FAMILY MEDICINE | Facility: CLINIC | Age: 89
End: 2024-02-29
Payer: MEDICARE

## 2024-02-29 PROCEDURE — G0511 CCM/BHI BY RHC/FQHC 20MIN MO: HCPCS | Mod: GW,,, | Performed by: NURSE PRACTITIONER

## 2024-03-07 RX ORDER — MEGESTROL ACETATE 40 MG/1
40 TABLET ORAL
Qty: 90 TABLET | Refills: 3 | Status: SHIPPED | OUTPATIENT
Start: 2024-03-07

## 2024-03-07 RX ORDER — LANCETS 33 GAUGE
EACH MISCELLANEOUS
Qty: 200 EACH | Refills: 3 | Status: SHIPPED | OUTPATIENT
Start: 2024-03-07

## 2024-03-07 RX ORDER — CALCIUM CITRATE/VITAMIN D3 200MG-6.25
TABLET ORAL
Qty: 200 STRIP | Refills: 3 | Status: SHIPPED | OUTPATIENT
Start: 2024-03-07